# Patient Record
Sex: FEMALE | Race: WHITE | NOT HISPANIC OR LATINO | Employment: FULL TIME | URBAN - METROPOLITAN AREA
[De-identification: names, ages, dates, MRNs, and addresses within clinical notes are randomized per-mention and may not be internally consistent; named-entity substitution may affect disease eponyms.]

---

## 2023-06-13 LAB
ALANINE AMINOTRANSFERASE (SGPT) (U/L) IN SER/PLAS: 20 U/L (ref 7–45)
ALBUMIN (G/DL) IN SER/PLAS: 4.2 G/DL (ref 3.4–5)
ALKALINE PHOSPHATASE (U/L) IN SER/PLAS: 52 U/L (ref 33–110)
ANION GAP IN SER/PLAS: 12 MMOL/L (ref 10–20)
ASPARTATE AMINOTRANSFERASE (SGOT) (U/L) IN SER/PLAS: 14 U/L (ref 9–39)
BILIRUBIN TOTAL (MG/DL) IN SER/PLAS: 0.5 MG/DL (ref 0–1.2)
CALCIUM (MG/DL) IN SER/PLAS: 9.3 MG/DL (ref 8.6–10.3)
CARBON DIOXIDE, TOTAL (MMOL/L) IN SER/PLAS: 25 MMOL/L (ref 21–32)
CHLORIDE (MMOL/L) IN SER/PLAS: 105 MMOL/L (ref 98–107)
CREATININE (MG/DL) IN SER/PLAS: 0.85 MG/DL (ref 0.5–1.05)
DEHYDROEPIANDROSTERONE SULFATE (DHEA-S) (UG/DL) IN SER/: 32 UG/DL (ref 65–395)
ESTIMATED AVERAGE GLUCOSE FOR HBA1C: 105 MG/DL
GFR FEMALE: >90 ML/MIN/1.73M2
GLUCOSE (MG/DL) IN SER/PLAS: 87 MG/DL (ref 74–99)
HEMOGLOBIN A1C/HEMOGLOBIN TOTAL IN BLOOD: 5.3 %
INSULIN, FASTING: 25 UIU/ML (ref 3–25)
POTASSIUM (MMOL/L) IN SER/PLAS: 4 MMOL/L (ref 3.5–5.3)
PROTEIN TOTAL: 7 G/DL (ref 6.4–8.2)
SODIUM (MMOL/L) IN SER/PLAS: 138 MMOL/L (ref 136–145)
UREA NITROGEN (MG/DL) IN SER/PLAS: 14 MG/DL (ref 6–23)

## 2023-06-19 LAB
TESTOSTERONE FREE (CHAN): 6.7 PG/ML (ref 0.1–6.4)
TESTOSTERONE,TOTAL,LC-MS/MS: 37 NG/DL (ref 2–45)

## 2023-10-04 ENCOUNTER — TELEPHONE (OUTPATIENT)
Dept: OBSTETRICS AND GYNECOLOGY | Facility: CLINIC | Age: 28
End: 2023-10-04

## 2023-10-04 DIAGNOSIS — E88.819 INSULIN RESISTANCE: ICD-10-CM

## 2023-10-04 DIAGNOSIS — E28.2 PCOS (POLYCYSTIC OVARIAN SYNDROME): ICD-10-CM

## 2023-10-04 RX ORDER — METFORMIN HYDROCHLORIDE EXTENDED-RELEASE TABLETS 500 MG/1
1000 TABLET, FILM COATED, EXTENDED RELEASE ORAL
COMMUNITY
End: 2023-10-04 | Stop reason: SDUPTHER

## 2023-10-05 DIAGNOSIS — E88.819 INSULIN RESISTANCE: ICD-10-CM

## 2023-10-05 DIAGNOSIS — E28.2 PCOS (POLYCYSTIC OVARIAN SYNDROME): ICD-10-CM

## 2023-10-05 RX ORDER — METFORMIN HYDROCHLORIDE EXTENDED-RELEASE TABLETS 500 MG/1
1000 TABLET, FILM COATED, EXTENDED RELEASE ORAL
Qty: 180 TABLET | Refills: 0 | Status: SHIPPED | OUTPATIENT
Start: 2023-10-05 | End: 2023-10-05

## 2023-10-05 RX ORDER — METFORMIN HYDROCHLORIDE 500 MG/1
1000 TABLET, EXTENDED RELEASE ORAL
Qty: 60 TABLET | Refills: 3 | Status: SHIPPED | OUTPATIENT
Start: 2023-10-05 | End: 2023-12-06 | Stop reason: SDUPTHER

## 2023-10-16 DIAGNOSIS — N97.0 INFERTILITY ASSOCIATED WITH ANOVULATION: Primary | ICD-10-CM

## 2023-10-16 NOTE — TELEPHONE ENCOUNTER
Patient of Dr. Mccall last seen September 2023 is on week 4 and would like some blood work for ovulation.

## 2023-10-18 ENCOUNTER — LAB (OUTPATIENT)
Dept: LAB | Facility: LAB | Age: 28
End: 2023-10-18
Payer: COMMERCIAL

## 2023-10-18 DIAGNOSIS — N97.0 INFERTILITY ASSOCIATED WITH ANOVULATION: ICD-10-CM

## 2023-10-18 LAB — PROGEST SERPL-MCNC: 0.3 NG/ML

## 2023-10-18 PROCEDURE — 84144 ASSAY OF PROGESTERONE: CPT

## 2023-10-18 PROCEDURE — 36415 COLL VENOUS BLD VENIPUNCTURE: CPT

## 2023-10-19 ENCOUNTER — TELEPHONE (OUTPATIENT)
Dept: OBSTETRICS AND GYNECOLOGY | Facility: CLINIC | Age: 28
End: 2023-10-19
Payer: COMMERCIAL

## 2023-10-19 DIAGNOSIS — N97.0 INFERTILITY ASSOCIATED WITH ANOVULATION: Primary | ICD-10-CM

## 2023-10-19 RX ORDER — CLOMIPHENE CITRATE 50 MG/1
100 TABLET ORAL DAILY
Qty: 10 TABLET | Refills: 0 | Status: SHIPPED | OUTPATIENT
Start: 2023-10-19 | End: 2023-10-24

## 2023-10-19 NOTE — TELEPHONE ENCOUNTER
Patient called about results of progesteron saw it was low probably did not ovulate and said you guwang talked about increasing her clomid so she is wondering if that can be done? Please advise

## 2023-11-27 ENCOUNTER — TELEPHONE (OUTPATIENT)
Dept: OBSTETRICS AND GYNECOLOGY | Facility: CLINIC | Age: 28
End: 2023-11-27
Payer: COMMERCIAL

## 2023-11-27 DIAGNOSIS — N97.0 ABSENCE OF OVULATION: Primary | ICD-10-CM

## 2023-11-27 NOTE — TELEPHONE ENCOUNTER
Patient is requesting order to check progesterone levels, to see if she ovulated, she would like a call back to be notified if possible

## 2023-11-28 ENCOUNTER — LAB (OUTPATIENT)
Dept: LAB | Facility: LAB | Age: 28
End: 2023-11-28
Payer: COMMERCIAL

## 2023-11-28 DIAGNOSIS — N97.0 ABSENCE OF OVULATION: ICD-10-CM

## 2023-11-28 LAB — PROGEST SERPL-MCNC: <0.3 NG/ML

## 2023-11-28 PROCEDURE — 84144 ASSAY OF PROGESTERONE: CPT

## 2023-11-28 PROCEDURE — 36415 COLL VENOUS BLD VENIPUNCTURE: CPT

## 2023-11-29 ENCOUNTER — TELEPHONE (OUTPATIENT)
Dept: OBSTETRICS AND GYNECOLOGY | Facility: CLINIC | Age: 28
End: 2023-11-29
Payer: COMMERCIAL

## 2023-11-29 DIAGNOSIS — N97.0 INFERTILITY ASSOCIATED WITH ANOVULATION: ICD-10-CM

## 2023-11-29 DIAGNOSIS — N91.2 AMENORRHEA: Primary | ICD-10-CM

## 2023-11-29 RX ORDER — FLUTICASONE PROPIONATE 50 MCG
2 SPRAY, SUSPENSION (ML) NASAL DAILY
COMMUNITY
Start: 2021-09-13

## 2023-11-29 RX ORDER — PRENATAL WITH FERROUS FUM AND FOLIC ACID 3080; 920; 120; 400; 22; 1.84; 3; 20; 10; 1; 12; 200; 27; 25; 2 [IU]/1; [IU]/1; MG/1; [IU]/1; MG/1; MG/1; MG/1; MG/1; MG/1; MG/1; UG/1; MG/1; MG/1; MG/1; MG/1
1 TABLET ORAL DAILY
COMMUNITY
Start: 2023-10-10

## 2023-11-29 RX ORDER — LEVOCETIRIZINE DIHYDROCHLORIDE 5 MG/1
5 TABLET, FILM COATED ORAL
COMMUNITY
Start: 2021-04-22

## 2023-11-29 RX ORDER — LETROZOLE 2.5 MG/1
2.5 TABLET, FILM COATED ORAL DAILY
Qty: 5 TABLET | Refills: 1 | Status: SHIPPED | OUTPATIENT
Start: 2023-11-29 | End: 2023-12-11

## 2023-11-29 RX ORDER — MEDROXYPROGESTERONE ACETATE 10 MG/1
10 TABLET ORAL DAILY
Qty: 5 TABLET | Refills: 2 | Status: SHIPPED | OUTPATIENT
Start: 2023-11-29 | End: 2024-02-16 | Stop reason: SDUPTHER

## 2023-12-11 ENCOUNTER — TELEMEDICINE (OUTPATIENT)
Dept: OBSTETRICS AND GYNECOLOGY | Facility: CLINIC | Age: 28
End: 2023-12-11
Payer: COMMERCIAL

## 2023-12-11 VITALS — HEIGHT: 66 IN | WEIGHT: 200 LBS | BODY MASS INDEX: 32.14 KG/M2

## 2023-12-11 DIAGNOSIS — N97.0 INFERTILITY ASSOCIATED WITH ANOVULATION: Primary | ICD-10-CM

## 2023-12-11 PROCEDURE — 99212 OFFICE O/P EST SF 10 MIN: CPT | Performed by: OBSTETRICS & GYNECOLOGY

## 2023-12-11 NOTE — PROGRESS NOTES
Subjective   Patient ID: Tabby Castano is a 28 y.o. female who presents for medication follow up (Go over medication provera, letrozolel, clomid and has a few question).  DINESH Morris is here for follow-up of her infertility treatment.  She took 2 months of Clomid 50 and 100 mg daily and did not ovulate and wanted to switch to letrozole..  Started 6 days ago and she just started letrozole 2.5 mg daily yesterday.  She has no side effects.  She continues to take her prenatal vitamins..  Will review ovulation kit, timing of intercourse and also repeating progesterone level day 23.  All her questions were answered.  This is a phone visit.    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  Orders were put in.  She will take Provera to start it.  If pregnancy test is negative and she has no spontaneous cycles.  Follow-up in couple of months.       Stepan Mccall MD 12/11/23 12:27 PM

## 2023-12-28 ENCOUNTER — TELEPHONE (OUTPATIENT)
Dept: OBSTETRICS AND GYNECOLOGY | Facility: CLINIC | Age: 28
End: 2023-12-28
Payer: COMMERCIAL

## 2023-12-28 NOTE — TELEPHONE ENCOUNTER
Patient is requesting to check her ovulation with progesterone blood work?     Last visit with Dr. Mccall 12/11/2023

## 2023-12-29 ENCOUNTER — LAB (OUTPATIENT)
Dept: LAB | Facility: LAB | Age: 28
End: 2023-12-29
Payer: COMMERCIAL

## 2023-12-29 DIAGNOSIS — N97.0 OVULATION FAILURE: Primary | ICD-10-CM

## 2023-12-29 DIAGNOSIS — N97.0 OVULATION FAILURE: ICD-10-CM

## 2023-12-29 PROCEDURE — 84144 ASSAY OF PROGESTERONE: CPT

## 2023-12-29 PROCEDURE — 36415 COLL VENOUS BLD VENIPUNCTURE: CPT

## 2023-12-30 LAB — PROGEST SERPL-MCNC: <0.3 NG/ML

## 2024-01-02 DIAGNOSIS — N97.0 INFERTILITY ASSOCIATED WITH ANOVULATION: Primary | ICD-10-CM

## 2024-01-02 RX ORDER — LETROZOLE 2.5 MG/1
5 TABLET, FILM COATED ORAL DAILY
Qty: 10 TABLET | Refills: 0 | Status: SHIPPED | OUTPATIENT
Start: 2024-01-02 | End: 2024-01-07

## 2024-01-29 ENCOUNTER — TELEPHONE (OUTPATIENT)
Dept: OBSTETRICS AND GYNECOLOGY | Facility: CLINIC | Age: 29
End: 2024-01-29
Payer: COMMERCIAL

## 2024-01-29 DIAGNOSIS — N97.0 INFERTILITY ASSOCIATED WITH ANOVULATION: ICD-10-CM

## 2024-01-29 ASSESSMENT — LIFESTYLE VARIABLES
TOBACCO_USE: NO
HISTORY_ALCOHOL_USE: NO

## 2024-01-30 ENCOUNTER — CONSULT (OUTPATIENT)
Dept: ENDOCRINOLOGY | Facility: CLINIC | Age: 29
End: 2024-01-30
Payer: COMMERCIAL

## 2024-01-30 ENCOUNTER — LAB (OUTPATIENT)
Dept: LAB | Facility: LAB | Age: 29
End: 2024-01-30
Payer: COMMERCIAL

## 2024-01-30 VITALS
WEIGHT: 223 LBS | HEIGHT: 66 IN | HEART RATE: 91 BPM | DIASTOLIC BLOOD PRESSURE: 87 MMHG | BODY MASS INDEX: 35.84 KG/M2 | SYSTOLIC BLOOD PRESSURE: 129 MMHG | TEMPERATURE: 97.5 F

## 2024-01-30 DIAGNOSIS — Z01.83 ENCOUNTER FOR RH BLOOD TYPING: ICD-10-CM

## 2024-01-30 DIAGNOSIS — Z31.41 FERTILITY TESTING: Primary | ICD-10-CM

## 2024-01-30 DIAGNOSIS — Z13.1 SCREENING FOR DIABETES MELLITUS: ICD-10-CM

## 2024-01-30 DIAGNOSIS — N91.3 PRIMARY OLIGOMENORRHEA: ICD-10-CM

## 2024-01-30 DIAGNOSIS — Z31.41 FERTILITY TESTING: ICD-10-CM

## 2024-01-30 DIAGNOSIS — Z01.812 ENCOUNTER FOR PREPROCEDURAL LABORATORY EXAMINATION: ICD-10-CM

## 2024-01-30 DIAGNOSIS — Z11.59 ENCOUNTER FOR SCREENING FOR OTHER VIRAL DISEASES: ICD-10-CM

## 2024-01-30 DIAGNOSIS — N97.0 INFERTILITY ASSOCIATED WITH ANOVULATION: ICD-10-CM

## 2024-01-30 DIAGNOSIS — Z11.3 SCREENING FOR STDS (SEXUALLY TRANSMITTED DISEASES): ICD-10-CM

## 2024-01-30 LAB
ABO GROUP (TYPE) IN BLOOD: NORMAL
ANTIBODY SCREEN: NORMAL
DHEA-S SERPL-MCNC: 52 UG/DL (ref 65–395)
EST. AVERAGE GLUCOSE BLD GHB EST-MCNC: 91 MG/DL
ESTRADIOL SERPL-MCNC: 264 PG/ML
FSH SERPL-ACNC: 15.3 IU/L
HBA1C MFR BLD: 4.8 %
HBV SURFACE AG SERPL QL IA: NONREACTIVE
HCV AB SER QL: NONREACTIVE
HIV 1+2 AB+HIV1 P24 AG SERPL QL IA: NONREACTIVE
LH SERPL-ACNC: 96 IU/L
PROGEST SERPL-MCNC: 0.7 NG/ML
PROLACTIN SERPL-MCNC: 10.4 UG/L (ref 3–20)
RH FACTOR (ANTIGEN D): NORMAL
RUBV IGG SERPL IA-ACNC: 1 IA
RUBV IGG SERPL QL IA: POSITIVE
TREPONEMA PALLIDUM IGG+IGM AB [PRESENCE] IN SERUM OR PLASMA BY IMMUNOASSAY: NONREACTIVE
TSH SERPL-ACNC: 2 MIU/L (ref 0.44–3.98)
VARICELLA ZOSTER IGG INDEX: 0.4 IA
VZV IGG SER QL IA: NEGATIVE

## 2024-01-30 PROCEDURE — 82627 DEHYDROEPIANDROSTERONE: CPT

## 2024-01-30 PROCEDURE — 84144 ASSAY OF PROGESTERONE: CPT

## 2024-01-30 PROCEDURE — 82670 ASSAY OF TOTAL ESTRADIOL: CPT

## 2024-01-30 PROCEDURE — 86901 BLOOD TYPING SEROLOGIC RH(D): CPT

## 2024-01-30 PROCEDURE — 83516 IMMUNOASSAY NONANTIBODY: CPT

## 2024-01-30 PROCEDURE — 84443 ASSAY THYROID STIM HORMONE: CPT

## 2024-01-30 PROCEDURE — 83498 ASY HYDROXYPROGESTERONE 17-D: CPT

## 2024-01-30 PROCEDURE — 84402 ASSAY OF FREE TESTOSTERONE: CPT

## 2024-01-30 PROCEDURE — 83036 HEMOGLOBIN GLYCOSYLATED A1C: CPT

## 2024-01-30 PROCEDURE — 86803 HEPATITIS C AB TEST: CPT

## 2024-01-30 PROCEDURE — 99215 OFFICE O/P EST HI 40 MIN: CPT | Performed by: OBSTETRICS & GYNECOLOGY

## 2024-01-30 PROCEDURE — 86317 IMMUNOASSAY INFECTIOUS AGENT: CPT

## 2024-01-30 PROCEDURE — 86780 TREPONEMA PALLIDUM: CPT

## 2024-01-30 PROCEDURE — 87800 DETECT AGNT MULT DNA DIREC: CPT

## 2024-01-30 PROCEDURE — 83001 ASSAY OF GONADOTROPIN (FSH): CPT

## 2024-01-30 PROCEDURE — 86900 BLOOD TYPING SEROLOGIC ABO: CPT

## 2024-01-30 PROCEDURE — 86787 VARICELLA-ZOSTER ANTIBODY: CPT

## 2024-01-30 PROCEDURE — 87389 HIV-1 AG W/HIV-1&-2 AB AG IA: CPT

## 2024-01-30 PROCEDURE — 87340 HEPATITIS B SURFACE AG IA: CPT

## 2024-01-30 PROCEDURE — 99205 OFFICE O/P NEW HI 60 MIN: CPT | Performed by: OBSTETRICS & GYNECOLOGY

## 2024-01-30 PROCEDURE — 83002 ASSAY OF GONADOTROPIN (LH): CPT

## 2024-01-30 PROCEDURE — 86850 RBC ANTIBODY SCREEN: CPT

## 2024-01-30 PROCEDURE — 36415 COLL VENOUS BLD VENIPUNCTURE: CPT

## 2024-01-30 PROCEDURE — 84146 ASSAY OF PROLACTIN: CPT

## 2024-01-30 ASSESSMENT — PATIENT HEALTH QUESTIONNAIRE - PHQ9
2. FEELING DOWN, DEPRESSED OR HOPELESS: NOT AT ALL
SUM OF ALL RESPONSES TO PHQ9 QUESTIONS 1 AND 2: 0
1. LITTLE INTEREST OR PLEASURE IN DOING THINGS: NOT AT ALL

## 2024-01-30 ASSESSMENT — COLUMBIA-SUICIDE SEVERITY RATING SCALE - C-SSRS
1. IN THE PAST MONTH, HAVE YOU WISHED YOU WERE DEAD OR WISHED YOU COULD GO TO SLEEP AND NOT WAKE UP?: NO
2. HAVE YOU ACTUALLY HAD ANY THOUGHTS OF KILLING YOURSELF?: NO
6. HAVE YOU EVER DONE ANYTHING, STARTED TO DO ANYTHING, OR PREPARED TO DO ANYTHING TO END YOUR LIFE?: NO

## 2024-01-30 ASSESSMENT — PAIN SCALES - GENERAL: PAINLEVEL: 0-NO PAIN

## 2024-01-30 NOTE — LETTER
2024     Stepan Mccall MD  [address]  [address]  [facility] [address]    Patient: Tabby Castano   YOB: 1995   Date of Visit: 2024       Dear Dr. Stepan Mccall MD:    Thank you for referring Tabby Castano to me for evaluation. Below are my notes for this consultation.  If you have questions, please do not hesitate to call me. I look forward to following your patient along with you.       Sincerely,     Jean Peguero MD      CC: No Recipients  ______________________________________________________________________________________      In Person    NEW FERTILITY PATIENT VISIT    Referred by: Referred by:: Dr. Mccall    Accompanied today by: Who is accompanying you to your visit:: Tanmay Castano ()      Tabby Castano is a 28 y.o.  female who presents today for evaluation and treatment of primary infertility, irregular menses.  The patient reports that she has always had irregular menses/oligomenorrhea and started on OCPs to regulate her cycles at the age of 17.  She continued these medications until approximately 3 years.  Following discontinuation she reports that menses once again became irregular and that she can go for several months at a time without having a menstrual period.  She does reports that menses do come reliably with use of Provera, and she has been using this recently to bring on monthly menstrual periods under the guidance of her doctor.      She and her partner have been attempting to become pregnant over the last year without success.  They were seen and evaluated by Dr. Mccall, and has been through 2 rounds of Clomid (up to 10 mg) and to rounds of letrozole 5 without resultant ovulation (no positive OPK or elevated progesterone levels).  They presents today to discuss further testing and next steps.  Patient reports that her last menstrual period was on 2024 (following Provera).    PRIOR EVALUATION / TREATMENT    Prior  Labs  Lab Results    Date Done      AMH: No results found for requested labs within last 1825 days. No results found for requested labs within last 1825 days.   TSH: 2.70 (Ref range: 0.44 - 3.98 mIU/L) 2/1/2023   PRL: 8.1 (Ref range: 3.0 - 20.0 ug/L) 2/1/2023   Testosterone: No results found for requested labs within last 1825 days. No results found for requested labs within last 1825 days.   DHEAS: 32 (L; Ref range: 65 - 395 ug/dL) 6/13/2023   FSH: 6.5 (Ref range: IU/L) 2/1/2023   17 OHP: No results found for requested labs within last 1825 days. No results found for requested labs within last 1825 days.   HgbA1c: 5.3 (Ref range: %) 6/13/2023   Hepatitis B surface antigen: No results found for requested labs within last 1825 days. No results found for requested labs within last 1825 days.   Hepatitis C antibody: No results found for requested labs within last 1825 days. No results found for requested labs within last 1825 days.   HIV ½ Antigen Antibody screen with reflex: No results found for requested labs within last 1825 days. No results found for requested labs within last 1825 days.   Syphilis screening with reflex: No results found for requested labs within last 1825 days. No results found for requested labs within last 1825 days.   GC: No results found for requested labs within last 1825 days. No results found for requested labs within last 1825 days.   CT: No results found for requested labs within last 1825 days. No results found for requested labs within last 1825 days.   Type and Screen: No results found for requested labs within last 1825 days. No results found for requested labs within last 1825 days.   Rh: No results found for requested labs within last 1825 days. No results found for requested labs within last 1825 days.   Antibody: No results found for requested labs within last 1825 days. No results found for requested labs within last 1825 days.   Rubella: No results found for requested labs  within last 1825 days. No results found for requested labs within last 1825 days.   Varicella: No results found for requested labs within last 1825 days. No results found for requested labs within last 1825 days.   Hemoglobin: No results found for requested labs within last 1825 days. No results found for requested labs within last 1825 days.   Hematocrit: No results found for requested labs within last 1825 days. No results found for requested labs within last 1825 days.   Creatinine: 0.85 (Ref range: 0.50 - 1.05 mg/dL) 2023   AST:14 (Ref range: 9 - 39 U/L) 2023   ALT:20 (Ref range: 7 - 45 U/L): 2023        Relationship Status:       OB Hx     OB History          0    Para   0    Term   0       0    AB   0    Living   0         SAB   0    IAB   0    Ectopic   0    Multiple   0    Live Births   0                 GYN HISTORY   Have you ever been diagnosed with a sexually transmitted disease? Have you ever been diagnosed with a sexually transmitted disease?: No    Please select all that are applicable:    Have you ever had Pelvic Inflammatory Disease? Have you ever had Pelvic Inflammatory Disease?: No    Have you had an abnormal PAP smear? Have you had an abnormal PAP smear?: No    Date & Result of last PAP smear: negative cytology 2023  Have you ever had an abnormal Mammogram? Have you ever had an abnormal mammogram?: No    Date & result of your last mammogram:    Do you have pelvic pain? Do you have pelvic pain?: No    How many times per week do you have intercourse?    Do you have pain with intercourse? Do you have pain with intercourse?: No    Do you use lubricants with intercourse?    Do you have pain with bowel movements? Do you have pain with bowel movements?: No              Do you have pain with a full bladder? Do you have pain with a full bladder?: No    MENSTRUAL HISTORY  LMP: When was your last menstrual period?: Other    Menarche:    Contraception:    Cycle length:     Describe your bleeding: Describe your bleeding:: Average    Dysmenorrhea: Are your menstrual periods painful?: No       ENDOCRINE/INFERTILITY HISTORY  Duration of infertility: If applicable, what is the approximate date you began trying to get pregnant?: 1-5 years    Coital Activity/week:    Nipple Discharge: Do you experience any loss of milk or liquid discharge from the breasts?: No    Vision changes: Are you experiencing any vision changes?: No    Headaches: Are you experiencing headaches?: Yes    Excess hair growth: Are you experiencing persistent or worsening hair growth on the face, breasts or lower abdomen?: No    Excessive hair loss: Are you experiencing loss of hair from your scalp?: No    Acne: Are you experiencing acne?: No    Oily skin: Oily skin?: No    Recent weight change  Weight gain: Are you experiencing any increase in weight?: Yes    Weight loss: Are you experiencing any decrease in weight?: No    Exercise more than 3 times a week: Exercise more than 3 times a week?: No      PMH  Past Medical History:   Diagnosis Date   • Hyperlipidemia    • PCOS (polycystic ovarian syndrome)         MEDICATIONS  Current Outpatient Medications on File Prior to Visit   Medication Sig Dispense Refill   • fluticasone (Flonase) 50 mcg/actuation nasal spray Administer 2 sprays into each nostril once daily.     • levocetirizine (Xyzal) 5 mg tablet Take 1 tablet (5 mg) by mouth once daily.     • metFORMIN  mg 24 hr tablet Take 2 tablets (1,000 mg) by mouth once daily in the evening. Take with meals. 60 tablet 3   • Prenatal Vitamin Plus Low Iron 27 mg iron- 1 mg tablet Take 1 tablet by mouth once daily.     • medroxyPROGESTERone (Provera) 10 mg tablet Take 1 tablet (10 mg) by mouth once daily for 5 days. 5 tablet 2     No current facility-administered medications on file prior to visit.        PSH  Past Surgical History:   Procedure Laterality Date   • NASAL SEPTUM SURGERY     • TOE SURGERY Left    •  "TONSILECTOMY, ADENOIDECTOMY, BILATERAL MYRINGOTOMY AND TUBES     • TYMPANOSTOMY TUBE PLACEMENT Bilateral         PSYCH HISTORY  Have you ever been diagnosed with a mental health Issue?: No    Have you ever been hospitalized for a mental health disorder?: No       SOCIAL HISTORY  Social History     Tobacco Use   • Smoking status: Never     Passive exposure: Never   • Smokeless tobacco: Never   Vaping Use   • Vaping Use: Never used   Substance Use Topics   • Alcohol use: Not Currently   • Drug use: Never     Occupation: Your occupation:: RN    Have you ever been incarcerated? Have you ever been incarcerated?: No    Do you have a history of domestic violence? Do you have a history of domestic violence?: No    Do you feel safe at home? Do you feel safe at home?: Yes    Do you have a history of any negative sexual experience such as incest or rape? Do you have a history of any negative sexual experience such as incest or rape?: No       PARTNER HISTORY  Partner Name: Partner name:: Ajit    : Partner :: 05/15/95    Occupation: Partner occupation:: [employer]    Prior fertility history:   none  PMH:   \"cyst\" on testicle  PSH:    Smoking:Partner: Recent or current tobacco use: No    Alcohol Use: Partner: Recent or current alcohol use: Yes    Drug Use: Partner: Recent or current drug use: No    Medications:    Injuries: Partner: Any history of surgeries or injuries in the reproductive area?: No    STD: Have you ever been diagnosed with a sexually transmitted disease?: No    Please select all that are applicable:    SA: Prior Semen Analysis completed?: No    SA Results:      FAMILY HISTORY  No family history on file.    CANCER HISTORY    Breast: Breast Cancer: Please answer Yes, No or Unsure. If Yes, please, identify which family member.: No    Ovarian: Ovarian Cancer: Please answer Yes, No or Unsure. If Yes, please, identify which family member.: Yes maternal grandmother    Colon: Colon Cancer: Please answer Yes, No or " "Unsure. If Yes, please, identify which family member.: No    Endometrial: Endometrial Cancer: Please answer Yes, No or Unsure. If Yes, please, identify which family member.: No      FAMILY VTE HISTORY  Family History of Blood Clots: Blood Clots: Please answer Yes, No or Unsure. If Yes, please, identify which family member.: Yes patenral grandmother      GENETIC HISTORY  Ethnic Background  Patient: Ethnic Background Patient:: White    Partner: Ethnic Background Partner:: White    Genetic Disease in Family  Patient: Patient: Defects and genetic syndromes? Please answer Yes, No or Unsure: No    Partner: Partner: Defects and genetic syndromes? Please answer Yes, No or Unsure: No    Birth Defects in Family  Patient: Patient: Birth defects? Please answer Yes, No or Unsure: No    Partner: Partner: Birth defects? Please answer Yes, No or Unsure: No    Genetic screening performed previously:       BMI:   BMI Readings from Last 1 Encounters:   01/30/24 35.99 kg/m²     VITALS:  /87   Pulse 91   Temp 36.4 °C (97.5 °F)   Ht 1.676 m (5' 6\")   Wt 101 kg (223 lb)   LMP 01/09/2024 (Exact Date)   BMI 35.99 kg/m²     ASSESSMENT   Alexandra is a 28-year-old female who presents today for evaluation and treatment of primary infertility, oligomenorrhea likely associated with anovulation.    COUNSELING  We discussed causes of infertility including hormonal, egg quality issues, structural problems such as endometriosis, adhesions, or tubal problems, uterine factors such as polyps or fibroids, and sperm issues. Reviewed evaluation of such as well. We discussed various methods for achieving pregnancy in some detail including, ovulation induction, insemination, superovulation and IVF.    Routine Testing  Fertility Center  STDs Within 1 year   Genetic carrier Waiver/Completed   T&S Within 1 year   AMH Within 1 year   TSH Within 1 year   Rubella/Varicella Within 5 years     BMI Testing  Fertility Center  CBC Within 1 year   CMP " Within 1 year   HgbA1c Within 1 year   Mag, Phos, Vit D <18 Within 1 year   MFM > 40  REQ   Wt loss consult > 40 OPT     PLAN  Orders Placed This Encounter   Procedures   • Hysterosalpingogram (HSG)   • US pelvis transvaginal   • FL hysterosalpingogram   • Antimullerian Hormone (Amh)   • TSH with reflex to Free T4 if abnormal   • Prolactin   • Testosterone,Free and Total   • Dhea-Sulfate   • 17-Hydroxyprogesterone   • Hemoglobin A1C   • Follicle Stimulating Hormone   • Luteinizing Hormone   • Estradiol   • Type And Screen   • Progesterone   • Rubella Antibody, Igg   • Varicella Zoster Antibody, Igg   • Hepatitis B surface antigen   • Hepatitis C Antibody   • HIV 1/2 Antigen/Antibody Screen with Reflex to Confirmation   • Syphilis Screen with Reflex   • C. Trachomatis / N. Gonorrhoeae, Amplified Detection   • POCT pregnancy, urine manually resulted       GENETIC SCREENING PATIENT  Declined, will need waiver    PARTNER  Yes Semen Analysis: Ordered  Yes Genetic screening: declined    FOLLOW UP   Consults: Financial consult  Chart to primary nurse for care coordination and patient check list/education  Enroll in Engaged MD  Take prenatal vitamins, vitamin D 2000 IUs daily  Discussed that PAP and mammogram must be updated if appropriate based on age and clinical history and results received before treatment can begin  Discussed that treatment cannot proceed until checklist items are complete   6 week follow up with MD  Additional testing for BMI < 18 or > 40: NA      A copy of this note was sent electronically to Dr. Stefany Peguero  01/30/2024  8:26 AM

## 2024-01-30 NOTE — Clinical Note
Patient was seen for a new fertility visit, see my note for details.  Can someone reach out to her when they have an opportunity?  Thank you

## 2024-01-30 NOTE — PROGRESS NOTES
In Person    NEW FERTILITY PATIENT VISIT    Referred by: Referred by:: Dr. Mccall    Accompanied today by: Who is accompanying you to your visit:: Tanmay Castano ()      Tabby Castano is a 28 y.o.  female who presents today for evaluation and treatment of primary infertility, irregular menses.  The patient reports that she has always had irregular menses/oligomenorrhea and started on OCPs to regulate her cycles at the age of 17.  She continued these medications until approximately 3 years.  Following discontinuation she reports that menses once again became irregular and that she can go for several months at a time without having a menstrual period.  She does reports that menses do come reliably with use of Provera, and she has been using this recently to bring on monthly menstrual periods under the guidance of her doctor.      She and her partner have been attempting to become pregnant over the last year without success.  They were seen and evaluated by Dr. Mccall, and has been through 2 rounds of Clomid (up to 10 mg) and to rounds of letrozole 5 without resultant ovulation (no positive OPK or elevated progesterone levels).  They presents today to discuss further testing and next steps.  Patient reports that her last menstrual period was on 2024 (following Provera).    PRIOR EVALUATION / TREATMENT    Prior Labs  Lab Results    Date Done      AMH: No results found for requested labs within last 1825 days. No results found for requested labs within last 1825 days.   TSH: 2.70 (Ref range: 0.44 - 3.98 mIU/L) 2023   PRL: 8.1 (Ref range: 3.0 - 20.0 ug/L) 2023   Testosterone: No results found for requested labs within last 1825 days. No results found for requested labs within last 1825 days.   DHEAS: 32 (L; Ref range: 65 - 395 ug/dL) 2023   FSH: 6.5 (Ref range: IU/L) 2023   17 OHP: No results found for requested labs within last 1825 days. No results found for  requested labs within last 1825 days.   HgbA1c: 5.3 (Ref range: %) 6/13/2023   Hepatitis B surface antigen: No results found for requested labs within last 1825 days. No results found for requested labs within last 1825 days.   Hepatitis C antibody: No results found for requested labs within last 1825 days. No results found for requested labs within last 1825 days.   HIV ½ Antigen Antibody screen with reflex: No results found for requested labs within last 1825 days. No results found for requested labs within last 1825 days.   Syphilis screening with reflex: No results found for requested labs within last 1825 days. No results found for requested labs within last 1825 days.   GC: No results found for requested labs within last 1825 days. No results found for requested labs within last 1825 days.   CT: No results found for requested labs within last 1825 days. No results found for requested labs within last 1825 days.   Type and Screen: No results found for requested labs within last 1825 days. No results found for requested labs within last 1825 days.   Rh: No results found for requested labs within last 1825 days. No results found for requested labs within last 1825 days.   Antibody: No results found for requested labs within last 1825 days. No results found for requested labs within last 1825 days.   Rubella: No results found for requested labs within last 1825 days. No results found for requested labs within last 1825 days.   Varicella: No results found for requested labs within last 1825 days. No results found for requested labs within last 1825 days.   Hemoglobin: No results found for requested labs within last 1825 days. No results found for requested labs within last 1825 days.   Hematocrit: No results found for requested labs within last 1825 days. No results found for requested labs within last 1825 days.   Creatinine: 0.85 (Ref range: 0.50 - 1.05 mg/dL) 6/13/2023   AST:14 (Ref range: 9 - 39 U/L) 6/13/2023    ALT:20 (Ref range: 7 - 45 U/L): 2023        Relationship Status:       OB Hx     OB History          0    Para   0    Term   0       0    AB   0    Living   0         SAB   0    IAB   0    Ectopic   0    Multiple   0    Live Births   0                 GYN HISTORY   Have you ever been diagnosed with a sexually transmitted disease? Have you ever been diagnosed with a sexually transmitted disease?: No    Please select all that are applicable:    Have you ever had Pelvic Inflammatory Disease? Have you ever had Pelvic Inflammatory Disease?: No    Have you had an abnormal PAP smear? Have you had an abnormal PAP smear?: No    Date & Result of last PAP smear: negative cytology 2023  Have you ever had an abnormal Mammogram? Have you ever had an abnormal mammogram?: No    Date & result of your last mammogram:    Do you have pelvic pain? Do you have pelvic pain?: No    How many times per week do you have intercourse?    Do you have pain with intercourse? Do you have pain with intercourse?: No    Do you use lubricants with intercourse?    Do you have pain with bowel movements? Do you have pain with bowel movements?: No              Do you have pain with a full bladder? Do you have pain with a full bladder?: No    MENSTRUAL HISTORY  LMP: When was your last menstrual period?: Other    Menarche:    Contraception:    Cycle length:    Describe your bleeding: Describe your bleeding:: Average    Dysmenorrhea: Are your menstrual periods painful?: No       ENDOCRINE/INFERTILITY HISTORY  Duration of infertility: If applicable, what is the approximate date you began trying to get pregnant?: 1-5 years    Coital Activity/week:    Nipple Discharge: Do you experience any loss of milk or liquid discharge from the breasts?: No    Vision changes: Are you experiencing any vision changes?: No    Headaches: Are you experiencing headaches?: Yes    Excess hair growth: Are you experiencing persistent or worsening hair growth on  the face, breasts or lower abdomen?: No    Excessive hair loss: Are you experiencing loss of hair from your scalp?: No    Acne: Are you experiencing acne?: No    Oily skin: Oily skin?: No    Recent weight change  Weight gain: Are you experiencing any increase in weight?: Yes    Weight loss: Are you experiencing any decrease in weight?: No    Exercise more than 3 times a week: Exercise more than 3 times a week?: No      PMH  Past Medical History:   Diagnosis Date    Hyperlipidemia     PCOS (polycystic ovarian syndrome)         MEDICATIONS  Current Outpatient Medications on File Prior to Visit   Medication Sig Dispense Refill    fluticasone (Flonase) 50 mcg/actuation nasal spray Administer 2 sprays into each nostril once daily.      levocetirizine (Xyzal) 5 mg tablet Take 1 tablet (5 mg) by mouth once daily.      metFORMIN  mg 24 hr tablet Take 2 tablets (1,000 mg) by mouth once daily in the evening. Take with meals. 60 tablet 3    Prenatal Vitamin Plus Low Iron 27 mg iron- 1 mg tablet Take 1 tablet by mouth once daily.      medroxyPROGESTERone (Provera) 10 mg tablet Take 1 tablet (10 mg) by mouth once daily for 5 days. 5 tablet 2     No current facility-administered medications on file prior to visit.        PSH  Past Surgical History:   Procedure Laterality Date    NASAL SEPTUM SURGERY      TOE SURGERY Left     TONSILECTOMY, ADENOIDECTOMY, BILATERAL MYRINGOTOMY AND TUBES      TYMPANOSTOMY TUBE PLACEMENT Bilateral         PSYCH HISTORY  Have you ever been diagnosed with a mental health Issue?: No    Have you ever been hospitalized for a mental health disorder?: No       SOCIAL HISTORY  Social History     Tobacco Use    Smoking status: Never     Passive exposure: Never    Smokeless tobacco: Never   Vaping Use    Vaping Use: Never used   Substance Use Topics    Alcohol use: Not Currently    Drug use: Never     Occupation: Your occupation:: RN    Have you ever been incarcerated? Have you ever been incarcerated?:  "No    Do you have a history of domestic violence? Do you have a history of domestic violence?: No    Do you feel safe at home? Do you feel safe at home?: Yes    Do you have a history of any negative sexual experience such as incest or rape? Do you have a history of any negative sexual experience such as incest or rape?: No       PARTNER HISTORY  Partner Name: Partner name:: Ajit    : Partner :: 05/15/95    Occupation: Partner occupation:: [employer]    Prior fertility history:   none  PMH:   \"cyst\" on testicle  PSH:    Smoking:Partner: Recent or current tobacco use: No    Alcohol Use: Partner: Recent or current alcohol use: Yes    Drug Use: Partner: Recent or current drug use: No    Medications:    Injuries: Partner: Any history of surgeries or injuries in the reproductive area?: No    STD: Have you ever been diagnosed with a sexually transmitted disease?: No    Please select all that are applicable:    SA: Prior Semen Analysis completed?: No    SA Results:      FAMILY HISTORY  No family history on file.    CANCER HISTORY    Breast: Breast Cancer: Please answer Yes, No or Unsure. If Yes, please, identify which family member.: No    Ovarian: Ovarian Cancer: Please answer Yes, No or Unsure. If Yes, please, identify which family member.: Yes maternal grandmother    Colon: Colon Cancer: Please answer Yes, No or Unsure. If Yes, please, identify which family member.: No    Endometrial: Endometrial Cancer: Please answer Yes, No or Unsure. If Yes, please, identify which family member.: No      FAMILY VTE HISTORY  Family History of Blood Clots: Blood Clots: Please answer Yes, No or Unsure. If Yes, please, identify which family member.: Yes patenral grandmother      GENETIC HISTORY  Ethnic Background  Patient: Ethnic Background Patient:: White    Partner: Ethnic Background Partner:: White    Genetic Disease in Family  Patient: Patient: Defects and genetic syndromes? Please answer Yes, No or Unsure: No    Partner: Partner: " "Defects and genetic syndromes? Please answer Yes, No or Unsure: No    Birth Defects in Family  Patient: Patient: Birth defects? Please answer Yes, No or Unsure: No    Partner: Partner: Birth defects? Please answer Yes, No or Unsure: No    Genetic screening performed previously:       BMI:   BMI Readings from Last 1 Encounters:   01/30/24 35.99 kg/m²     VITALS:  /87   Pulse 91   Temp 36.4 °C (97.5 °F)   Ht 1.676 m (5' 6\")   Wt 101 kg (223 lb)   LMP 01/09/2024 (Exact Date)   BMI 35.99 kg/m²     ASSESSMENT   Alexandra is a 28-year-old female who presents today for evaluation and treatment of primary infertility, oligomenorrhea likely associated with anovulation.    COUNSELING  We discussed causes of infertility including hormonal, egg quality issues, structural problems such as endometriosis, adhesions, or tubal problems, uterine factors such as polyps or fibroids, and sperm issues. Reviewed evaluation of such as well. We discussed various methods for achieving pregnancy in some detail including, ovulation induction, insemination, superovulation and IVF.    Routine Testing  Fertility Center  STDs Within 1 year   Genetic carrier Waiver/Completed   T&S Within 1 year   AMH Within 1 year   TSH Within 1 year   Rubella/Varicella Within 5 years     BMI Testing  Fertility Center  CBC Within 1 year   CMP Within 1 year   HgbA1c Within 1 year   Mag, Phos, Vit D <18 Within 1 year   MFM > 40  REQ   Wt loss consult > 40 OPT     PLAN  Orders Placed This Encounter   Procedures    Hysterosalpingogram (HSG)    US pelvis transvaginal    FL hysterosalpingogram    Antimullerian Hormone (Amh)    TSH with reflex to Free T4 if abnormal    Prolactin    Testosterone,Free and Total    Dhea-Sulfate    17-Hydroxyprogesterone    Hemoglobin A1C    Follicle Stimulating Hormone    Luteinizing Hormone    Estradiol    Type And Screen    Progesterone    Rubella Antibody, Igg    Varicella Zoster Antibody, Igg    Hepatitis B surface antigen "    Hepatitis C Antibody    HIV 1/2 Antigen/Antibody Screen with Reflex to Confirmation    Syphilis Screen with Reflex    C. Trachomatis / N. Gonorrhoeae, Amplified Detection    POCT pregnancy, urine manually resulted       GENETIC SCREENING PATIENT  Declined, will need waiver    PARTNER  Yes Semen Analysis: Ordered  Yes Genetic screening: declined    FOLLOW UP   Consults: Financial consult  Chart to primary nurse for care coordination and patient check list/education  Enroll in Engaged MD  Take prenatal vitamins, vitamin D 2000 IUs daily  Discussed that PAP and mammogram must be updated if appropriate based on age and clinical history and results received before treatment can begin  Discussed that treatment cannot proceed until checklist items are complete   6 week follow up with MD  Additional testing for BMI < 18 or > 40: NA      A copy of this note was sent electronically to Dr. Stefany Peguero  01/30/2024  8:26 AM

## 2024-01-31 DIAGNOSIS — N97.0 INFERTILITY ASSOCIATED WITH ANOVULATION: Primary | ICD-10-CM

## 2024-01-31 LAB
C TRACH RRNA SPEC QL NAA+PROBE: NEGATIVE
N GONORRHOEA DNA SPEC QL PROBE+SIG AMP: NEGATIVE

## 2024-02-03 LAB
17OHP SERPL-MCNC: 266.64 NG/DL
MIS SERPL-MCNC: 9.72 NG/ML (ref 0.4–16.02)
TESTOSTERONE FREE (CHAN): 7.3 PG/ML (ref 0.1–6.4)
TESTOSTERONE,TOTAL,LC-MS/MS: 50 NG/DL (ref 2–45)

## 2024-02-12 ENCOUNTER — TELEPHONE (OUTPATIENT)
Dept: ENDOCRINOLOGY | Facility: CLINIC | Age: 29
End: 2024-02-12
Payer: COMMERCIAL

## 2024-02-12 DIAGNOSIS — N97.0 INFERTILITY ASSOCIATED WITH ANOVULATION: Primary | ICD-10-CM

## 2024-02-12 NOTE — TELEPHONE ENCOUNTER
Patient is calling to talk to a nurse has some questions. Did not start her cycle yet.  
RN returned patient's phone call. Patient informed RN she takes provera provided by her OBGYN to induce her period. Patient also informed RN she will be taking letrozole from her OBGYN this cycle as well, and they plan to take 7.5mg this time. RN advised patient not to take letrozole and to complete her testing with our clinic. RN also informed patient she needs to stick to one office for fertility treatment to keep her safe and reduce potential miscommunication and errors. Patient verbalized understanding, and plans to not take letrozole in order to complete her ordered testing.   Theresa Castro RN 02/12/24 4:27 PM     
<-- Click to add NO pertinent Family History

## 2024-02-13 ENCOUNTER — LAB (OUTPATIENT)
Dept: LAB | Facility: LAB | Age: 29
End: 2024-02-13
Payer: COMMERCIAL

## 2024-02-13 DIAGNOSIS — N97.0 INFERTILITY ASSOCIATED WITH ANOVULATION: ICD-10-CM

## 2024-02-13 PROCEDURE — 36415 COLL VENOUS BLD VENIPUNCTURE: CPT

## 2024-02-13 PROCEDURE — 84144 ASSAY OF PROGESTERONE: CPT

## 2024-02-14 LAB — PROGEST SERPL-MCNC: 9.2 NG/ML

## 2024-02-16 DIAGNOSIS — N91.2 AMENORRHEA: ICD-10-CM

## 2024-02-16 RX ORDER — MEDROXYPROGESTERONE ACETATE 10 MG/1
10 TABLET ORAL DAILY
Qty: 5 TABLET | Refills: 0 | Status: SHIPPED | OUTPATIENT
Start: 2024-02-16 | End: 2024-02-21

## 2024-02-19 ENCOUNTER — TELEPHONE (OUTPATIENT)
Dept: ENDOCRINOLOGY | Facility: CLINIC | Age: 29
End: 2024-02-19

## 2024-02-19 DIAGNOSIS — N97.0 INFERTILITY ASSOCIATED WITH ANOVULATION: Primary | ICD-10-CM

## 2024-02-19 RX ORDER — LETROZOLE 2.5 MG/1
5 TABLET, FILM COATED ORAL DAILY
Qty: 14 TABLET | Refills: 1 | Status: SHIPPED | OUTPATIENT
Start: 2024-02-19 | End: 2024-03-25 | Stop reason: SDUPTHER

## 2024-02-19 NOTE — TELEPHONE ENCOUNTER
Reason for call: Questions  Notes: Pt was prescribed Letrozole by her OBGYN and started her cycle Friday 2/16. She wanted to make sure pushing her testing back won't be an issue as she wants to try 1 more month on Letrozole.    Doxepin Pregnancy And Lactation Text: This medication is Pregnancy Category C and it isn't known if it is safe during pregnancy. It is also excreted in breast milk and breast feeding isn't recommended.

## 2024-02-19 NOTE — TELEPHONE ENCOUNTER
Is this something you are willing to do for the patient? I thought this was an OTC allergy med? Thanks.

## 2024-02-19 NOTE — TELEPHONE ENCOUNTER
Patient requesting refill of Levocetirizine 5MG to be sent to [facility] in [facility], she states she has to start medication tomorrow

## 2024-02-19 NOTE — TELEPHONE ENCOUNTER
Patient called back stating she needed the Letrozole 2.5 mg taking 2 pills one time a day.  Patient is requesting that this be done today because she cannot pick it up tomorrow     Pt states her OB-GYN prescribed another month of letrozole. She started it this morning. We discussed boarding pass items.   She is going to hold off on HSG and pelvic US until next month  FUV rescheduled for April with Dr. Peguero  Varicella titer negative. She believes she had the varicella vaccination, but knows she did not have chicken pox. She will check her records and let us know.  SA if not pregnant this cycyle  Will call with next period if not pregnant this cycle to schedule HSG and pelvic US.   Pt verbalized understanding and is agreeable.   JD SOLIMAN on 2/20/24 at 9:46 AM.

## 2024-02-20 NOTE — PROGRESS NOTES
Boarding Pass Oral/Injectible with TIC/IUI Checklist    Age: 28 y.o.    Provider: Jean Peguero MD  Primary RN: Elvis Weber  Reasons for Treatment:  anovulation  Last BMI  24 : 35.99 kg/m²       Past Medical History:   Diagnosis Date    Hyperlipidemia     PCOS (polycystic ovarian syndrome)        Date Done Consultation Results/Comments   (FUV) Medication Protocol Stimulation protocol: ***  Trigger plan: {TAMERA TRIGGER PLAN:62215}  Adjuncts: {TAMERA ADJUNCTS:30175}  Notes: ***   *** Procedure Order Placed []     *** MFM Consult Okay to proceed? N/A   *** Psych Consult Okay to proceed? N/A   *** Genetics Consult Okay to proceed? N/A   *** Other    Date Done Female Labs Results/Comments   2024 T&S (Q 1 Year) Results for orders placed or performed in visit on 24 (from the past 8760 hour(s))   Type And Screen   Result Value Ref Range    ABO TYPE AB     Rh TYPE NEG     ANTIBODY SCREEN NEG         2024 Hep B sAg Nonreactive   2024 Hep C AB Nonreactive   2024 HIV Nonreactive   2024 Syphilis Nonreactive   2024 GC/CT GC: Negative  CT: Negative   2024 Rubella (Q 5 Years) Positive   2024 Varicella (Q 5 Years) Negative   2024 TSH 2.00 (Ref range: 0.44 - 3.98 mIU/L)   2024 HgbA1C 4.8 (Ref range: see below %)   2024 AMH 9.719   24 Carrier Screening Myriad 2bP:  Declined- waiver signed  N/A   *** Uterine Cavity Eval Pelvic US: ***    HSG: ***    SIS: ***    Hyster: (***)     2023 Pap Smear Lab Results   Component Value Date    CVTFINALDX  2023     A.  THINPREP PAP CERVICAL:              Specimen adequacy:       SATISFACTORY FOR EVALUATION.       Quality Indicator: Endocervical/transformation zone component is present.              General Categorization:       NEGATIVE FOR INTRAEPITHELIAL LESION OR MALIGNANCY.              Ancillary Testing:       Specimen does not meet the requisition-stated criteria for HPV testing.  See Pap test interpretation  above.               N/A Mammogram N/A           Date Done Male Labs (required if IUI)   Results/Comments  ***   *** Hep B sAg ***   *** Hep C AB  ***   *** HIV ***   *** Syphilis ***   *** GC/CT GC: ***  CT: ***   2/12/24 Carrier Screening ***  Declined- waiver signed  N/A   *** Semen Analysis  Volume(mL): ***  Count(million): ***  Motility(%): ***  Motile Count(million): ***   Date Done Miscellaneous Results/Comments    BMI Checklist  BMI > 40 or < 18 Added to chart:   No    >= 45 Checklist  Added to chart:   No     MD Completion:  Ectopic Risk: {YES/NO/NA:91513}  Medically Complex: {YES/NO/NA:70171}

## 2024-03-03 DIAGNOSIS — E88.819 INSULIN RESISTANCE: ICD-10-CM

## 2024-03-03 DIAGNOSIS — E28.2 PCOS (POLYCYSTIC OVARIAN SYNDROME): ICD-10-CM

## 2024-03-05 RX ORDER — METFORMIN HYDROCHLORIDE 500 MG/1
1000 TABLET, EXTENDED RELEASE ORAL
Qty: 180 TABLET | Refills: 1 | Status: SHIPPED | OUTPATIENT
Start: 2024-03-05

## 2024-03-12 ENCOUNTER — APPOINTMENT (OUTPATIENT)
Dept: ENDOCRINOLOGY | Facility: CLINIC | Age: 29
End: 2024-03-12
Payer: COMMERCIAL

## 2024-03-18 ENCOUNTER — TELEPHONE (OUTPATIENT)
Dept: OBSTETRICS AND GYNECOLOGY | Facility: CLINIC | Age: 29
End: 2024-03-18
Payer: COMMERCIAL

## 2024-03-18 DIAGNOSIS — N97.0 INFERTILITY ASSOCIATED WITH ANOVULATION: Primary | ICD-10-CM

## 2024-03-18 NOTE — TELEPHONE ENCOUNTER
Patient of Dr. Mccall calling wanting to know if we could do blood work to see if she ovulated. Can we call when they are ready? She stated she doesn't get notified. Thank you! [phone].

## 2024-03-25 DIAGNOSIS — N97.0 INFERTILITY ASSOCIATED WITH ANOVULATION: ICD-10-CM

## 2024-03-25 RX ORDER — LETROZOLE 2.5 MG/1
5 TABLET, FILM COATED ORAL DAILY
Qty: 14 TABLET | Refills: 1 | Status: SHIPPED | OUTPATIENT
Start: 2024-03-25 | End: 2024-04-30 | Stop reason: SDUPTHER

## 2024-03-25 NOTE — TELEPHONE ENCOUNTER
Pt states she has started her cycle and is requesting a refill of Letrozole, to [facility] in [place].

## 2024-04-08 ENCOUNTER — APPOINTMENT (OUTPATIENT)
Dept: ENDOCRINOLOGY | Facility: CLINIC | Age: 29
End: 2024-04-08
Payer: COMMERCIAL

## 2024-04-12 ENCOUNTER — TELEPHONE (OUTPATIENT)
Dept: OBSTETRICS AND GYNECOLOGY | Facility: CLINIC | Age: 29
End: 2024-04-12
Payer: COMMERCIAL

## 2024-04-12 NOTE — TELEPHONE ENCOUNTER
Patient was to have her progesterone checked on day 23 and cannot do it that day and would like to do cycle day 19 or day 24.  She is leaving out of town and would like to know by 1:00 today.

## 2024-04-17 ENCOUNTER — LAB (OUTPATIENT)
Dept: LAB | Facility: LAB | Age: 29
End: 2024-04-17
Payer: COMMERCIAL

## 2024-04-17 DIAGNOSIS — N97.0 INFERTILITY ASSOCIATED WITH ANOVULATION: ICD-10-CM

## 2024-04-17 PROCEDURE — 84144 ASSAY OF PROGESTERONE: CPT

## 2024-04-17 PROCEDURE — 36415 COLL VENOUS BLD VENIPUNCTURE: CPT

## 2024-04-18 LAB — PROGEST SERPL-MCNC: 14.1 NG/ML

## 2024-04-22 ENCOUNTER — PATIENT MESSAGE (OUTPATIENT)
Dept: OBSTETRICS AND GYNECOLOGY | Facility: CLINIC | Age: 29
End: 2024-04-22
Payer: COMMERCIAL

## 2024-04-22 DIAGNOSIS — N97.0 INFERTILITY ASSOCIATED WITH ANOVULATION: ICD-10-CM

## 2024-04-30 RX ORDER — LETROZOLE 2.5 MG/1
5 TABLET, FILM COATED ORAL DAILY
Qty: 14 TABLET | Refills: 1 | Status: SHIPPED | OUTPATIENT
Start: 2024-04-30

## 2024-05-21 ENCOUNTER — TELEPHONE (OUTPATIENT)
Dept: OBSTETRICS AND GYNECOLOGY | Facility: CLINIC | Age: 29
End: 2024-05-21
Payer: COMMERCIAL

## 2024-05-21 DIAGNOSIS — N97.0 INFERTILITY ASSOCIATED WITH ANOVULATION: ICD-10-CM

## 2024-05-28 ENCOUNTER — LAB (OUTPATIENT)
Dept: LAB | Facility: LAB | Age: 29
End: 2024-05-28
Payer: COMMERCIAL

## 2024-05-28 DIAGNOSIS — N97.0 INFERTILITY ASSOCIATED WITH ANOVULATION: ICD-10-CM

## 2024-05-28 LAB — PROGEST SERPL-MCNC: 13.3 NG/ML

## 2024-05-28 PROCEDURE — 84144 ASSAY OF PROGESTERONE: CPT

## 2024-05-28 PROCEDURE — 36415 COLL VENOUS BLD VENIPUNCTURE: CPT

## 2024-06-18 ENCOUNTER — APPOINTMENT (OUTPATIENT)
Dept: OBSTETRICS AND GYNECOLOGY | Facility: CLINIC | Age: 29
End: 2024-06-18
Payer: COMMERCIAL

## 2024-06-18 ENCOUNTER — LAB (OUTPATIENT)
Dept: LAB | Facility: LAB | Age: 29
End: 2024-06-18
Payer: COMMERCIAL

## 2024-06-18 VITALS — SYSTOLIC BLOOD PRESSURE: 122 MMHG | DIASTOLIC BLOOD PRESSURE: 82 MMHG | WEIGHT: 215 LBS | BODY MASS INDEX: 34.7 KG/M2

## 2024-06-18 DIAGNOSIS — Z32.01 PREGNANCY TEST POSITIVE (HHS-HCC): ICD-10-CM

## 2024-06-18 DIAGNOSIS — Z32.01 PREGNANCY TEST POSITIVE (HHS-HCC): Primary | ICD-10-CM

## 2024-06-18 PROBLEM — Z67.91 RH NEGATIVE STATE IN ANTEPARTUM PERIOD (HHS-HCC): Status: ACTIVE | Noted: 2024-06-18

## 2024-06-18 PROBLEM — O26.899 RH NEGATIVE STATE IN ANTEPARTUM PERIOD (HHS-HCC): Status: ACTIVE | Noted: 2024-06-18

## 2024-06-18 LAB
ABO GROUP (TYPE) IN BLOOD: NORMAL
AMPHETAMINES UR QL SCN: NORMAL
ANTIBODY SCREEN: NORMAL
B-HCG SERPL-ACNC: ABNORMAL MIU/ML
BARBITURATES UR QL SCN: NORMAL
BENZODIAZ UR QL SCN: NORMAL
BZE UR QL SCN: NORMAL
CANNABINOIDS UR QL SCN: NORMAL
ERYTHROCYTE [DISTWIDTH] IN BLOOD BY AUTOMATED COUNT: 13.3 % (ref 11.5–14.5)
FENTANYL+NORFENTANYL UR QL SCN: NORMAL
HBV SURFACE AG SERPL QL IA: NONREACTIVE
HCT VFR BLD AUTO: 40.7 % (ref 36–46)
HCV AB SER QL: NONREACTIVE
HGB BLD-MCNC: 13.5 G/DL (ref 12–16)
HIV 1+2 AB+HIV1 P24 AG SERPL QL IA: NONREACTIVE
MCH RBC QN AUTO: 28.8 PG (ref 26–34)
MCHC RBC AUTO-ENTMCNC: 33.2 G/DL (ref 32–36)
MCV RBC AUTO: 87 FL (ref 80–100)
METHADONE UR QL SCN: NORMAL
NRBC BLD-RTO: 0 /100 WBCS (ref 0–0)
OPIATES UR QL SCN: NORMAL
OXYCODONE+OXYMORPHONE UR QL SCN: NORMAL
PCP UR QL SCN: NORMAL
PLATELET # BLD AUTO: 312 X10*3/UL (ref 150–450)
POC GLUCOSE, URINE: NEGATIVE MG/DL
POC PROTEIN, URINE: NEGATIVE MG/DL
PREGNANCY TEST URINE, POC: POSITIVE
RBC # BLD AUTO: 4.69 X10*6/UL (ref 4–5.2)
REFLEX ADDED, ANEMIA PANEL: NORMAL
RH FACTOR (ANTIGEN D): NORMAL
RUBV IGG SERPL IA-ACNC: 1.2 IA
RUBV IGG SERPL QL IA: POSITIVE
TREPONEMA PALLIDUM IGG+IGM AB [PRESENCE] IN SERUM OR PLASMA BY IMMUNOASSAY: NONREACTIVE
WBC # BLD AUTO: 6.3 X10*3/UL (ref 4.4–11.3)

## 2024-06-18 PROCEDURE — 83036 HEMOGLOBIN GLYCOSYLATED A1C: CPT

## 2024-06-18 PROCEDURE — 86780 TREPONEMA PALLIDUM: CPT

## 2024-06-18 PROCEDURE — 86317 IMMUNOASSAY INFECTIOUS AGENT: CPT

## 2024-06-18 PROCEDURE — 87340 HEPATITIS B SURFACE AG IA: CPT

## 2024-06-18 PROCEDURE — 86803 HEPATITIS C AB TEST: CPT

## 2024-06-18 PROCEDURE — 86900 BLOOD TYPING SEROLOGIC ABO: CPT

## 2024-06-18 PROCEDURE — 87491 CHLMYD TRACH DNA AMP PROBE: CPT

## 2024-06-18 PROCEDURE — 87086 URINE CULTURE/COLONY COUNT: CPT

## 2024-06-18 PROCEDURE — 87661 TRICHOMONAS VAGINALIS AMPLIF: CPT

## 2024-06-18 PROCEDURE — 86901 BLOOD TYPING SEROLOGIC RH(D): CPT

## 2024-06-18 PROCEDURE — 36415 COLL VENOUS BLD VENIPUNCTURE: CPT

## 2024-06-18 PROCEDURE — 87624 HPV HI-RISK TYP POOLED RSLT: CPT

## 2024-06-18 PROCEDURE — 84702 CHORIONIC GONADOTROPIN TEST: CPT

## 2024-06-18 PROCEDURE — 87389 HIV-1 AG W/HIV-1&-2 AB AG IA: CPT

## 2024-06-18 PROCEDURE — 85027 COMPLETE CBC AUTOMATED: CPT

## 2024-06-18 PROCEDURE — 80307 DRUG TEST PRSMV CHEM ANLYZR: CPT

## 2024-06-18 PROCEDURE — 86850 RBC ANTIBODY SCREEN: CPT

## 2024-06-18 PROCEDURE — 87591 N.GONORRHOEAE DNA AMP PROB: CPT

## 2024-06-18 SDOH — ECONOMIC STABILITY: TRANSPORTATION INSECURITY
IN THE PAST 12 MONTHS, HAS THE LACK OF TRANSPORTATION KEPT YOU FROM MEDICAL APPOINTMENTS OR FROM GETTING MEDICATIONS?: NO

## 2024-06-18 SDOH — ECONOMIC STABILITY: TRANSPORTATION INSECURITY
IN THE PAST 12 MONTHS, HAS LACK OF TRANSPORTATION KEPT YOU FROM MEETINGS, WORK, OR FROM GETTING THINGS NEEDED FOR DAILY LIVING?: NO

## 2024-06-18 ASSESSMENT — EDINBURGH POSTNATAL DEPRESSION SCALE (EPDS)
I HAVE FELT SAD OR MISERABLE: NO, NOT AT ALL
I HAVE BEEN ANXIOUS OR WORRIED FOR NO GOOD REASON: YES, SOMETIMES
I HAVE FELT SCARED OR PANICKY FOR NO GOOD REASON: NO, NOT AT ALL
TOTAL SCORE: 3
THE THOUGHT OF HARMING MYSELF HAS OCCURRED TO ME: NEVER
I HAVE LOOKED FORWARD WITH ENJOYMENT TO THINGS: AS MUCH AS I EVER DID
I HAVE BEEN ABLE TO LAUGH AND SEE THE FUNNY SIDE OF THINGS: AS MUCH AS I ALWAYS COULD
I HAVE BEEN SO UNHAPPY THAT I HAVE BEEN CRYING: NO, NEVER
I HAVE BEEN SO UNHAPPY THAT I HAVE HAD DIFFICULTY SLEEPING: NOT AT ALL
THINGS HAVE BEEN GETTING ON TOP OF ME: NO, MOST OF THE TIME I HAVE COPED QUITE WELL
I HAVE BLAMED MYSELF UNNECESSARILY WHEN THINGS WENT WRONG: NO, NEVER

## 2024-06-18 ASSESSMENT — SOCIAL DETERMINANTS OF HEALTH (SDOH)

## 2024-06-18 NOTE — PROGRESS NOTES
Subjective   Patient ID [id number]   Tabby Castano is a 28 y.o.  at 7w4d with a working estimated date of delivery of 2025, by Last Menstrual Period who presents for an initial prenatal visit. This pregnancy is planned.  History of infertility last 1.5 years, tried Clomid then Letrazole.  Pregnant on Letrazole 5 mg.   Positive breast tenderness, no other symptoms.  Some cramping.    Happy.      Her pregnancy is complicated by:  Infertility  Obesity    OB History    Para Term  AB Living   1 0 0 0 0 0   SAB IAB Ectopic Multiple Live Births   0 0 0 0 0      # Outcome Date GA Lbr Dante/2nd Weight Sex Delivery Anes PTL Lv   1 Current              Hurricane  Depression Scale Total: 3    Objective   Physical Exam  Weight: 97.5 kg (215 lb)  Expected Total Weight Gain: Could not be calculated   Pregravid BMI: Could not be calculated  BP: 122/82          OBGyn Exam    Prenatal Labs  Pending will check QBHCG x 2 levels.     Assessment/Plan   Problem List Items Addressed This Visit    None  Visit Diagnoses         Codes    Pregnancy test positive ([facility])    -  Primary Z32.01    Relevant Orders    CBC Anemia Panel With Reflex,Pregnancy    Drug Screen, Urine With Reflex to Confirmation    Hepatitis B Surface Antigen    Hepatitis C Antibody    HIV 1/2 Antigen/Antibody Screen with Reflex to Confirmation    Myriad [place] Carrier Screen    POCT pregnancy, urine manually resulted (Completed)    Rubella Antibody, IgG    Syphilis Screen with Reflex    THINPREP PAP TEST    Type And Screen    Urine Culture    US MAC OB imaging order    US OB < 14 weeks early    POCT UA (nonautomated) manually resulted (Completed)            Immunizations: N/A  Prenatal Labs ordered QBHCG x 2 levels.   Daily prenatal vitamins prescribed  First trimester screening and second trimester screening discussed. Patient decided to Proceed.   Follow up in 4 weeks for return OB visit.

## 2024-06-19 LAB
BACTERIA UR CULT: NO GROWTH
EST. AVERAGE GLUCOSE BLD GHB EST-MCNC: 91 MG/DL
HBA1C MFR BLD: 4.8 %

## 2024-06-20 LAB
C TRACH RRNA SPEC QL NAA+PROBE: NEGATIVE
N GONORRHOEA DNA SPEC QL PROBE+SIG AMP: NEGATIVE

## 2024-06-21 LAB — T VAGINALIS RRNA SPEC QL NAA+PROBE: NEGATIVE

## 2024-07-02 LAB — SCAN RESULT: NORMAL

## 2024-07-03 ENCOUNTER — ANCILLARY PROCEDURE (OUTPATIENT)
Dept: RADIOLOGY | Facility: HOSPITAL | Age: 29
End: 2024-07-03
Payer: COMMERCIAL

## 2024-07-03 DIAGNOSIS — Z32.01 PREGNANCY TEST POSITIVE (HHS-HCC): ICD-10-CM

## 2024-07-03 LAB
CYTOLOGY CMNT CVX/VAG CYTO-IMP: NORMAL
HPV HR 12 DNA GENITAL QL NAA+PROBE: NEGATIVE
HPV HR GENOTYPES PNL CVX NAA+PROBE: NEGATIVE
HPV16 DNA SPEC QL NAA+PROBE: NEGATIVE
HPV18 DNA SPEC QL NAA+PROBE: NEGATIVE
LAB AP HPV GENOTYPE QUESTION: YES
LAB AP HPV HR: NORMAL
LAB AP PAP ADDITIONAL TESTS: NORMAL
LABORATORY COMMENT REPORT: NORMAL
MENSTRUAL HX REPORTED: NORMAL
PATH REPORT.TOTAL CANCER: NORMAL

## 2024-07-03 PROCEDURE — 76801 OB US < 14 WKS SINGLE FETUS: CPT | Performed by: OBSTETRICS & GYNECOLOGY

## 2024-07-03 PROCEDURE — 76801 OB US < 14 WKS SINGLE FETUS: CPT

## 2024-07-05 ENCOUNTER — TELEPHONE (OUTPATIENT)
Dept: OBSTETRICS AND GYNECOLOGY | Facility: CLINIC | Age: 29
End: 2024-07-05
Payer: COMMERCIAL

## 2024-07-05 NOTE — TELEPHONE ENCOUNTER
----- Message from Stepan Mccall MD sent at 7/4/2024  9:13 AM EDT -----  Repeat pap in 1 year  ----- Message -----  From: Lori Boyer RN  Sent: 6/18/2024   9:08 AM EDT  To: Stepan Mccall MD

## 2024-07-11 ENCOUNTER — TELEPHONE (OUTPATIENT)
Dept: OBSTETRICS AND GYNECOLOGY | Facility: CLINIC | Age: 29
End: 2024-07-11
Payer: COMMERCIAL

## 2024-07-11 NOTE — TELEPHONE ENCOUNTER
Patient would like to know if it is necessary for her to continue taking metformin? although she states she stopped taking this medication about one week ago due to making her not feel well. she states she started this to help get pregnant, she is currently pregnant. Please advise

## 2024-07-22 ENCOUNTER — APPOINTMENT (OUTPATIENT)
Dept: LAB | Facility: LAB | Age: 29
End: 2024-07-22
Payer: COMMERCIAL

## 2024-07-22 ENCOUNTER — APPOINTMENT (OUTPATIENT)
Dept: OBSTETRICS AND GYNECOLOGY | Facility: CLINIC | Age: 29
End: 2024-07-22
Payer: COMMERCIAL

## 2024-07-22 VITALS — BODY MASS INDEX: 34.86 KG/M2 | DIASTOLIC BLOOD PRESSURE: 74 MMHG | WEIGHT: 216 LBS | SYSTOLIC BLOOD PRESSURE: 106 MMHG

## 2024-07-22 DIAGNOSIS — Z3A.11 11 WEEKS GESTATION OF PREGNANCY (HHS-HCC): Primary | ICD-10-CM

## 2024-07-22 LAB
POC GLUCOSE, URINE: NEGATIVE MG/DL
POC PROTEIN, URINE: NEGATIVE MG/DL

## 2024-07-22 NOTE — PROGRESS NOTES
"Subjective   Patient ID [id number]   Tabby Castano is a 28 y.o.  at 11w1d with a working estimated date of delivery of 2025, by Ultrasound who presents for a routine prenatal visit. She denies vaginal bleeding, leakage of fluid, decreased fetal movements, and contractions.  Doing well.      Her pregnancy is complicated by:  PCOS    Objective   Physical Exam  Weight: 98 kg (216 lb), Pregravid BMI: Could not be calculated  Expected Total Weight Gain: Could not be calculated   BP: 106/74  Fetal Heart Rate: 160      Prenatal Labs  Urine dip:  No results found for: \"KETONESU\", \"GLUCOSEUR\", \"LEUKOCYTESUR\"  Lab Results   Component Value Date    HGB 13.5 2024    HCT 40.7 2024    ABO AB 2024    HEPBSAG Nonreactive 2024     No results found for: \"PAPPA\", \"AFP\", \"HCG\", \"ESTRIOL\", \"INHBA\"    Imaging  The most recent ultrasound was performed on The most recent ultrasound study is not finalized with a study GA of The most recent ultrasound study is not finalized.  The most recent ultrasound study is not finalized  The most recent ultrasound study is not finalized    Assessment/Plan   Problem List Items Addressed This Visit    None  Visit Diagnoses         Codes    11 weeks gestation of pregnancy ([facility])    -  Primary Z3A.11    Relevant Orders    POCT UA Automated manually resulted (Completed)    Myriad Prequel Prenatal Screen            Continue prenatal vitamin.  She says Metformin makes her sick a lot of stomach cramps.   I told her okay to stop taking it.     Labs reviewed.  Cell Free DNA test today.   Order placed for anatomy scan at 20 weeks.  Follow up in 4 weeks for a routine prenatal visit.  "

## 2024-07-31 LAB — SCAN RESULT: NORMAL

## 2024-08-05 ENCOUNTER — PROCEDURE VISIT (OUTPATIENT)
Dept: RADIOLOGY | Facility: HOSPITAL | Age: 29
End: 2024-08-05
Payer: COMMERCIAL

## 2024-08-05 DIAGNOSIS — Z32.01 PREGNANCY TEST POSITIVE (HHS-HCC): ICD-10-CM

## 2024-08-05 PROCEDURE — 76816 OB US FOLLOW-UP PER FETUS: CPT | Performed by: OBSTETRICS & GYNECOLOGY

## 2024-08-05 PROCEDURE — 76816 OB US FOLLOW-UP PER FETUS: CPT

## 2024-08-09 ENCOUNTER — TELEPHONE (OUTPATIENT)
Dept: OBSTETRICS AND GYNECOLOGY | Facility: CLINIC | Age: 29
End: 2024-08-09
Payer: COMMERCIAL

## 2024-08-09 NOTE — TELEPHONE ENCOUNTER
SH PT called with c/o pruple nail bed on her feet and hands, denies SOB. Wants to speak to a nurse.

## 2024-08-16 ENCOUNTER — LAB (OUTPATIENT)
Dept: LAB | Facility: LAB | Age: 29
End: 2024-08-16
Payer: COMMERCIAL

## 2024-08-16 ENCOUNTER — APPOINTMENT (OUTPATIENT)
Dept: OBSTETRICS AND GYNECOLOGY | Facility: CLINIC | Age: 29
End: 2024-08-16
Payer: COMMERCIAL

## 2024-08-16 VITALS — BODY MASS INDEX: 34.99 KG/M2 | SYSTOLIC BLOOD PRESSURE: 120 MMHG | WEIGHT: 216.8 LBS | DIASTOLIC BLOOD PRESSURE: 70 MMHG

## 2024-08-16 DIAGNOSIS — O26.812 PREGNANCY RELATED FATIGUE IN SECOND TRIMESTER (HHS-HCC): ICD-10-CM

## 2024-08-16 DIAGNOSIS — Z3A.14 14 WEEKS GESTATION OF PREGNANCY (HHS-HCC): ICD-10-CM

## 2024-08-16 DIAGNOSIS — O26.812 PREGNANCY RELATED FATIGUE IN SECOND TRIMESTER (HHS-HCC): Primary | ICD-10-CM

## 2024-08-16 LAB
ALBUMIN SERPL BCP-MCNC: 3.6 G/DL (ref 3.4–5)
ALP SERPL-CCNC: 34 U/L (ref 33–110)
ALT SERPL W P-5'-P-CCNC: 20 U/L (ref 7–45)
ANION GAP SERPL CALC-SCNC: 10 MMOL/L (ref 10–20)
AST SERPL W P-5'-P-CCNC: 13 U/L (ref 9–39)
BILIRUB SERPL-MCNC: 0.4 MG/DL (ref 0–1.2)
BUN SERPL-MCNC: 6 MG/DL (ref 6–23)
CALCIUM SERPL-MCNC: 9.1 MG/DL (ref 8.6–10.3)
CHLORIDE SERPL-SCNC: 107 MMOL/L (ref 98–107)
CO2 SERPL-SCNC: 23 MMOL/L (ref 21–32)
CREAT SERPL-MCNC: 0.62 MG/DL (ref 0.5–1.05)
EGFRCR SERPLBLD CKD-EPI 2021: >90 ML/MIN/1.73M*2
FERRITIN SERPL-MCNC: 68 NG/ML (ref 8–150)
GLUCOSE SERPL-MCNC: 99 MG/DL (ref 74–99)
IRON SATN MFR SERPL: 20 %
IRON SERPL-MCNC: 79 UG/DL
POC GLUCOSE, URINE: NEGATIVE MG/DL
POC PROTEIN, URINE: NEGATIVE MG/DL
POTASSIUM SERPL-SCNC: 3.8 MMOL/L (ref 3.5–5.3)
PROT SERPL-MCNC: 6.1 G/DL (ref 6.4–8.2)
SODIUM SERPL-SCNC: 136 MMOL/L (ref 136–145)
TIBC SERPL-MCNC: 396 UG/DL
TSH SERPL-ACNC: 1.09 MIU/L (ref 0.44–3.98)
UIBC SERPL-MCNC: 317 UG/DL

## 2024-08-16 PROCEDURE — 82728 ASSAY OF FERRITIN: CPT

## 2024-08-16 PROCEDURE — 84443 ASSAY THYROID STIM HORMONE: CPT

## 2024-08-16 PROCEDURE — 80053 COMPREHEN METABOLIC PANEL: CPT

## 2024-08-16 PROCEDURE — 83550 IRON BINDING TEST: CPT

## 2024-08-16 PROCEDURE — 36415 COLL VENOUS BLD VENIPUNCTURE: CPT

## 2024-08-16 NOTE — PROGRESS NOTES
"Subjective   Patient ID [id number]   Tabby Castano is a 28 y.o.  at 14w5d with a working estimated date of delivery of 2025, by Ultrasound who presents for a routine prenatal visit. She denies vaginal bleeding, leakage of fluid, decreased fetal movements, or contractions.  Doing fine c/o dizzy, and fingers are purple.   Noo bleeding no vomiting.     Her pregnancy is complicated by:  Obesity  PCOS  Rh negative    Objective   Physical Exam  Weight: 98.3 kg (216 lb 12.8 oz)  Expected Total Weight Gain: Could not be calculated   Pregravid BMI: Could not be calculated  BP: 120/70  Fetal Heart Rate: 169 Fundal Height (cm): 14 cm    Prenatal Labs  Urine dip:  No results found for: \"KETONESU\", \"GLUCOSEUR\", \"LEUKOCYTESUR\"    Lab Results   Component Value Date    HGB 13.5 2024    HCT 40.7 2024    ABO AB 2024    HEPBSAG Nonreactive 2024     No results found for: \"PAPPA\", \"AFP\", \"HCG\", \"ESTRIOL\", \"INHBA\"  No results found for: \"GLUF\", \"GLUT1\", \"MOCQGZE3VF\", \"ELLYHUI5AF\"    Imaging  The most recent ultrasound was performed on The most recent ultrasound study is not finalized with a study GA of The most recent ultrasound study is not finalized and EFW of The most recent ultrasound study is not finalized.  The most recent ultrasound study is not finalized  The most recent ultrasound study is not finalized    Assessment/Plan   Problem List Items Addressed This Visit    None  Visit Diagnoses         Codes    Pregnancy related fatigue in second trimester ([facility])    -  Primary O26.812    Relevant Orders    Comprehensive Metabolic Panel    TSH with reflex to Free T4 if abnormal    Iron + TIBC Pregnancy    Ferritin    POCT UA Automated manually resulted (Completed)    14 weeks gestation of pregnancy ([facility])     Z3A.14            Continue prenatal vitamin.  Labs reviewed.  Rhogam at 28 weeks  GTT at 24-28 weeks.  Encouraged fluid intake, mineral drinks encouraged. Slow change of position " discussed. I discussed symptoms of second trimester, per her request some labs ordered.     Follow up in 4 weeks for a routine prenatal visit.

## 2024-09-03 DIAGNOSIS — Z32.01 PREGNANCY TEST POSITIVE (HHS-HCC): ICD-10-CM

## 2024-09-03 RX ORDER — PRENATAL WITH FERROUS FUM AND FOLIC ACID 3080; 920; 120; 400; 22; 1.84; 3; 20; 10; 1; 12; 200; 27; 25; 2 [IU]/1; [IU]/1; MG/1; [IU]/1; MG/1; MG/1; MG/1; MG/1; MG/1; MG/1; UG/1; MG/1; MG/1; MG/1; MG/1
1 TABLET ORAL DAILY
Qty: 30 TABLET | Refills: 11 | Status: SHIPPED | OUTPATIENT
Start: 2024-09-03 | End: 2025-09-03

## 2024-09-03 NOTE — TELEPHONE ENCOUNTER
Patient requesting refill of Prenatal Vitamin Plus Low Iron 27 mg iron- 1 mg tablet to [facility] in [place]

## 2024-09-09 ENCOUNTER — APPOINTMENT (OUTPATIENT)
Dept: OBSTETRICS AND GYNECOLOGY | Facility: CLINIC | Age: 29
End: 2024-09-09
Payer: COMMERCIAL

## 2024-09-09 VITALS — BODY MASS INDEX: 35.77 KG/M2 | SYSTOLIC BLOOD PRESSURE: 120 MMHG | DIASTOLIC BLOOD PRESSURE: 68 MMHG | WEIGHT: 221.6 LBS

## 2024-09-09 DIAGNOSIS — Z3A.18 18 WEEKS GESTATION OF PREGNANCY (HHS-HCC): Primary | ICD-10-CM

## 2024-09-09 LAB
POC GLUCOSE, URINE: NEGATIVE MG/DL
POC PROTEIN, URINE: NEGATIVE MG/DL

## 2024-09-09 PROCEDURE — 0501F PRENATAL FLOW SHEET: CPT | Performed by: OBSTETRICS & GYNECOLOGY

## 2024-09-09 NOTE — PROGRESS NOTES
"Subjective   Patient ID [id number]   Tabby Castano is a 28 y.o.  at 18w1d with a working estimated date of delivery of 2025, by Ultrasound who presents for a routine prenatal visit. She denies vaginal bleeding, leakage of fluid, decreased fetal movements, or contractions.  Doing well, c/o skin tag in genital area no iching or bleeding.       Her pregnancy is complicated by:  Overweight    Objective   Physical Exam  Weight: 101 kg (221 lb 9.6 oz)  Expected Total Weight Gain: Could not be calculated   Pregravid BMI: Could not be calculated  BP: 120/68  Fetal Heart Rate: 158    Small skin tag benign right labia majora.       Prenatal Labs  Urine dip:  No results found for: \"KETONESU\", \"GLUCOSEUR\", \"LEUKOCYTESUR\"    Lab Results   Component Value Date    HGB 13.5 2024    HCT 40.7 2024    ABO AB 2024    HEPBSAG Nonreactive 2024     No results found for: \"PAPPA\", \"AFP\", \"HCG\", \"ESTRIOL\", \"INHBA\"  No results found for: \"GLUF\", \"GLUT1\", \"CXADSGG4XN\", \"TVRYMUM6BK\"    Imaging  The most recent ultrasound was performed on The most recent ultrasound study is not finalized with a study GA of The most recent ultrasound study is not finalized and EFW of The most recent ultrasound study is not finalized.  The most recent ultrasound study is not finalized  The most recent ultrasound study is not finalized    Assessment/Plan   Problem List Items Addressed This Visit    None  Visit Diagnoses         Codes    18 weeks gestation of pregnancy ([facility])    -  Primary Z3A.18    Relevant Orders    POCT UA Automated manually resulted (Completed)            Continue prenatal vitamin.  Labs reviewed.  Rhogam at 28 weeks.   GTT at 28 weeks.  Anatomy U/S in 2-3 weeks.   Follow up in 4 weeks for a routine prenatal visit.  "

## 2024-09-24 ENCOUNTER — HOSPITAL ENCOUNTER (OUTPATIENT)
Dept: RADIOLOGY | Facility: CLINIC | Age: 29
Discharge: HOME | End: 2024-09-24
Payer: COMMERCIAL

## 2024-09-24 DIAGNOSIS — Z32.01 PREGNANCY TEST POSITIVE (HHS-HCC): ICD-10-CM

## 2024-09-24 DIAGNOSIS — O35.9XX0 MATERNAL CARE FOR (SUSPECTED) FETAL ABNORMALITY AND DAMAGE, UNSPECIFIED, NOT APPLICABLE OR UNSPECIFIED: ICD-10-CM

## 2024-09-24 PROCEDURE — 76811 OB US DETAILED SNGL FETUS: CPT

## 2024-09-24 PROCEDURE — 76811 OB US DETAILED SNGL FETUS: CPT | Performed by: OBSTETRICS & GYNECOLOGY

## 2024-10-16 ENCOUNTER — TELEPHONE (OUTPATIENT)
Dept: OBSTETRICS AND GYNECOLOGY | Facility: CLINIC | Age: 29
End: 2024-10-16

## 2024-10-16 ENCOUNTER — ANCILLARY PROCEDURE (OUTPATIENT)
Dept: RADIOLOGY | Facility: HOSPITAL | Age: 29
End: 2024-10-16
Payer: COMMERCIAL

## 2024-10-16 DIAGNOSIS — Z32.01 PREGNANCY TEST POSITIVE (HHS-HCC): ICD-10-CM

## 2024-10-16 PROCEDURE — 76816 OB US FOLLOW-UP PER FETUS: CPT

## 2024-10-16 PROCEDURE — 76816 OB US FOLLOW-UP PER FETUS: CPT | Performed by: STUDENT IN AN ORGANIZED HEALTH CARE EDUCATION/TRAINING PROGRAM

## 2024-10-16 NOTE — TELEPHONE ENCOUNTER
Patient returned call. Patient concerned that she noticed swelling in her ankles yesterday. 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 Patient did take her blood pressure and it was originally 135/84. Once she relaxed more her repeat was 119/78. Advised patient to increase oral water intake, use compression stockings, and elevate legs while sitting. Will follow up at appointment on Monday.

## 2024-10-21 ENCOUNTER — APPOINTMENT (OUTPATIENT)
Dept: OBSTETRICS AND GYNECOLOGY | Facility: CLINIC | Age: 29
End: 2024-10-21
Payer: COMMERCIAL

## 2024-10-21 VITALS — BODY MASS INDEX: 37.19 KG/M2 | DIASTOLIC BLOOD PRESSURE: 60 MMHG | WEIGHT: 230.4 LBS | SYSTOLIC BLOOD PRESSURE: 118 MMHG

## 2024-10-21 DIAGNOSIS — Z3A.24 24 WEEKS GESTATION OF PREGNANCY (HHS-HCC): Primary | ICD-10-CM

## 2024-10-21 LAB
POC GLUCOSE, URINE: NEGATIVE MG/DL
POC PROTEIN, URINE: NEGATIVE MG/DL

## 2024-10-21 PROCEDURE — 0501F PRENATAL FLOW SHEET: CPT | Performed by: OBSTETRICS & GYNECOLOGY

## 2024-10-21 ASSESSMENT — EDINBURGH POSTNATAL DEPRESSION SCALE (EPDS)
I HAVE BLAMED MYSELF UNNECESSARILY WHEN THINGS WENT WRONG: NOT VERY OFTEN
TOTAL SCORE: 3
THINGS HAVE BEEN GETTING ON TOP OF ME: NO, MOST OF THE TIME I HAVE COPED QUITE WELL
I HAVE FELT SCARED OR PANICKY FOR NO GOOD REASON: NO, NOT AT ALL
THE THOUGHT OF HARMING MYSELF HAS OCCURRED TO ME: NEVER
I HAVE BEEN ANXIOUS OR WORRIED FOR NO GOOD REASON: HARDLY EVER
I HAVE BEEN SO UNHAPPY THAT I HAVE BEEN CRYING: NO, NEVER
I HAVE BEEN ABLE TO LAUGH AND SEE THE FUNNY SIDE OF THINGS: AS MUCH AS I ALWAYS COULD
I HAVE LOOKED FORWARD WITH ENJOYMENT TO THINGS: AS MUCH AS I EVER DID
I HAVE FELT SAD OR MISERABLE: NO, NOT AT ALL
I HAVE BEEN SO UNHAPPY THAT I HAVE HAD DIFFICULTY SLEEPING: NOT AT ALL

## 2024-10-21 NOTE — PROGRESS NOTES
"Subjective   Patient ID [id number]   Tabby Castano is a 28 y.o.  at 24w1d with a working estimated date of delivery of 2025, by Ultrasound who presents for a routine prenatal visit. She denies vaginal bleeding, leakage of fluid, decreased fetal movements, or contractions.  Doing well, no Headaches.     Her pregnancy is complicated by:  Obesity    Objective   Physical Exam:   Weight: 105 kg (230 lb 6.4 oz)  Expected Total Weight Gain: Could not be calculated   Pregravid BMI: Could not be calculated  BP: 118/60  Fetal Heart Rate: 158               Prenatal Labs  Urine Dip:  No results found for: \"KETONESU\", \"GLUCOSEUR\", \"LEUKOCYTESUR\"  Lab Results   Component Value Date    HGB 13.5 2024    HCT 40.7 2024    ABO AB 2024    HEPBSAG Nonreactive 2024     No results found for: \"PAPPA\", \"AFP\", \"HCG\", \"ESTRIOL\", \"INHBA\"  No results found for: \"GLUF\", \"GLUT1\", \"JMSUFTM2SA\", \"OBTIERV5EQ\"    Imaging  The most recent ultrasound was performed on The most recent ultrasound study is not finalized with a study GA of The most recent ultrasound study is not finalized and EFW of The most recent ultrasound study is not finalized.  The most recent ultrasound study is not finalized  The most recent ultrasound study is not finalized    Assessment/Plan   Problem List Items Addressed This Visit    None  Visit Diagnoses         Codes    24 weeks gestation of pregnancy ([facility])    -  Primary Z3A.24    Relevant Orders    POCT UA Automated manually resulted (Completed)          Continue prenatal vitamin.  Labs reviewed.  Glucola test next visit.  Rhogam next visit.   GBS taken.  Expected mode of delivery Vaginal  Follow up in 4 weeks for  a routine prenatal visit.  "

## 2024-11-18 ENCOUNTER — APPOINTMENT (OUTPATIENT)
Dept: OBSTETRICS AND GYNECOLOGY | Facility: CLINIC | Age: 29
End: 2024-11-18
Payer: COMMERCIAL

## 2024-11-18 ENCOUNTER — LAB (OUTPATIENT)
Dept: LAB | Facility: LAB | Age: 29
End: 2024-11-18
Payer: COMMERCIAL

## 2024-11-18 DIAGNOSIS — Z3A.24 24 WEEKS GESTATION OF PREGNANCY (HHS-HCC): ICD-10-CM

## 2024-11-18 LAB
ABO GROUP (TYPE) IN BLOOD: NORMAL
ERYTHROCYTE [DISTWIDTH] IN BLOOD BY AUTOMATED COUNT: 14.4 % (ref 11.5–14.5)
GLUCOSE 1H P 50 G GLC PO SERPL-MCNC: 110 MG/DL
HCT VFR BLD AUTO: 36.7 % (ref 36–46)
HGB BLD-MCNC: 11.9 G/DL (ref 12–16)
MCH RBC QN AUTO: 28.1 PG (ref 26–34)
MCHC RBC AUTO-ENTMCNC: 32.4 G/DL (ref 32–36)
MCV RBC AUTO: 87 FL (ref 80–100)
NRBC BLD-RTO: 0 /100 WBCS (ref 0–0)
PLATELET # BLD AUTO: 270 X10*3/UL (ref 150–450)
RBC # BLD AUTO: 4.23 X10*6/UL (ref 4–5.2)
REFLEX ADDED, ANEMIA PANEL: NORMAL
RH FACTOR (ANTIGEN D): NORMAL
TREPONEMA PALLIDUM IGG+IGM AB [PRESENCE] IN SERUM OR PLASMA BY IMMUNOASSAY: NONREACTIVE
WBC # BLD AUTO: 9.6 X10*3/UL (ref 4.4–11.3)

## 2024-11-18 PROCEDURE — 86900 BLOOD TYPING SEROLOGIC ABO: CPT

## 2024-11-18 PROCEDURE — 36415 COLL VENOUS BLD VENIPUNCTURE: CPT

## 2024-11-18 PROCEDURE — 86780 TREPONEMA PALLIDUM: CPT

## 2024-11-18 PROCEDURE — 85027 COMPLETE CBC AUTOMATED: CPT

## 2024-11-18 PROCEDURE — 82947 ASSAY GLUCOSE BLOOD QUANT: CPT

## 2024-11-18 PROCEDURE — 86901 BLOOD TYPING SEROLOGIC RH(D): CPT

## 2024-11-21 ENCOUNTER — APPOINTMENT (OUTPATIENT)
Dept: OBSTETRICS AND GYNECOLOGY | Facility: CLINIC | Age: 29
End: 2024-11-21

## 2024-11-22 ENCOUNTER — APPOINTMENT (OUTPATIENT)
Dept: OBSTETRICS AND GYNECOLOGY | Facility: CLINIC | Age: 29
End: 2024-11-22

## 2024-11-22 VITALS — WEIGHT: 236 LBS | BODY MASS INDEX: 38.09 KG/M2 | SYSTOLIC BLOOD PRESSURE: 122 MMHG | DIASTOLIC BLOOD PRESSURE: 70 MMHG

## 2024-11-22 DIAGNOSIS — Z23 NEED FOR TDAP VACCINATION: ICD-10-CM

## 2024-11-22 DIAGNOSIS — Z29.13 NEED FOR RHOGAM DUE TO RH NEGATIVE MOTHER: Primary | ICD-10-CM

## 2024-11-22 DIAGNOSIS — Z3A.28 28 WEEKS GESTATION OF PREGNANCY (HHS-HCC): ICD-10-CM

## 2024-11-22 LAB
POC GLUCOSE, URINE: NEGATIVE MG/DL
POC PROTEIN, URINE: NEGATIVE MG/DL

## 2024-11-22 NOTE — PROGRESS NOTES
"Subjective   Patient ID [id number]   Tabby Castano is a 29 y.o.  at 28w5d with a working estimated date of delivery of 2025, by Ultrasound who presents for a routine prenatal visit. She denies vaginal bleeding, leakage of fluid, decreased fetal movements, or contractions.  Doing well.       Her pregnancy is complicated by:  Obesity    Objective   Physical Exam:   Weight: 107 kg (236 lb)  Expected Total Weight Gain: Could not be calculated   Pregravid BMI: Could not be calculated  BP: 122/70  Fetal Heart Rate: 151               Prenatal Labs  Urine Dip:  No results found for: \"KETONESU\", \"GLUCOSEUR\", \"LEUKOCYTESUR\"  Lab Results   Component Value Date    HGB 11.9 (L) 2024    HCT 36.7 2024    ABO AB 2024    HEPBSAG Nonreactive 2024     No results found for: \"PAPPA\", \"AFP\", \"HCG\", \"ESTRIOL\", \"INHBA\"  No results found for: \"GLUF\", \"GLUT1\", \"KLPLATZ7VI\", \"XNOKJVX4PT\"    Imaging  The most recent ultrasound was performed on The most recent ultrasound study is not finalized with a study GA of The most recent ultrasound study is not finalized and EFW of The most recent ultrasound study is not finalized.  The most recent ultrasound study is not finalized  The most recent ultrasound study is not finalized    Assessment/Plan   Problem List Items Addressed This Visit    None  Visit Diagnoses         Codes    28 weeks gestation of pregnancy ([facility])    -  Primary Z3A.28    Relevant Orders    POCT UA Automated manually resulted (Completed)          Continue prenatal vitamin.  Labs reviewed.  GDM normal,  Rhogam and Tdap  today.     GBS N/A.   Expected mode of delivery Vaginal  Follow up in 2 weeks  for a routine prenatal visit.  "

## 2024-12-02 ENCOUNTER — ANCILLARY PROCEDURE (OUTPATIENT)
Dept: RADIOLOGY | Facility: HOSPITAL | Age: 29
End: 2024-12-02
Payer: COMMERCIAL

## 2024-12-02 ENCOUNTER — APPOINTMENT (OUTPATIENT)
Dept: OBSTETRICS AND GYNECOLOGY | Facility: CLINIC | Age: 29
End: 2024-12-02
Payer: COMMERCIAL

## 2024-12-02 VITALS — SYSTOLIC BLOOD PRESSURE: 116 MMHG | WEIGHT: 241 LBS | BODY MASS INDEX: 38.9 KG/M2 | DIASTOLIC BLOOD PRESSURE: 68 MMHG

## 2024-12-02 DIAGNOSIS — Z32.01 PREGNANCY TEST POSITIVE (HHS-HCC): ICD-10-CM

## 2024-12-02 DIAGNOSIS — O99.213 OBESITY AFFECTING PREGNANCY IN THIRD TRIMESTER (HHS-HCC): ICD-10-CM

## 2024-12-02 DIAGNOSIS — Z3A.30 30 WEEKS GESTATION OF PREGNANCY (HHS-HCC): Primary | ICD-10-CM

## 2024-12-02 PROCEDURE — 76819 FETAL BIOPHYS PROFIL W/O NST: CPT | Performed by: STUDENT IN AN ORGANIZED HEALTH CARE EDUCATION/TRAINING PROGRAM

## 2024-12-02 PROCEDURE — 0501F PRENATAL FLOW SHEET: CPT | Performed by: OBSTETRICS & GYNECOLOGY

## 2024-12-02 PROCEDURE — 76816 OB US FOLLOW-UP PER FETUS: CPT | Performed by: STUDENT IN AN ORGANIZED HEALTH CARE EDUCATION/TRAINING PROGRAM

## 2024-12-02 PROCEDURE — 76819 FETAL BIOPHYS PROFIL W/O NST: CPT

## 2024-12-02 PROCEDURE — 76816 OB US FOLLOW-UP PER FETUS: CPT

## 2024-12-02 NOTE — PROGRESS NOTES
"Subjective   Patient ID [id number]   Tabby Castano is a 29 y.o.  at 30w1d with a working estimated date of delivery of 2025, by Ultrasound who presents for a routine prenatal visit. She denies vaginal bleeding, leakage of fluid, decreased fetal movements, or contractions.  Doing well.     Her pregnancy is complicated by:  Obesity    Objective   Physical Exam:   Weight: 109 kg (241 lb)  Expected Total Weight Gain: 5 kg (11 lb)-9 kg (19 lb)   Pregravid BMI: 32.30  BP: 116/68  Fetal Heart Rate: 138               Prenatal Labs  Urine Dip:  No results found for: \"KETONESU\", \"GLUCOSEUR\", \"LEUKOCYTESUR\"  Lab Results   Component Value Date    HGB 11.9 (L) 2024    HCT 36.7 2024    ABO AB 2024    HEPBSAG Nonreactive 2024     No results found for: \"PAPPA\", \"AFP\", \"HCG\", \"ESTRIOL\", \"INHBA\"  No results found for: \"GLUF\", \"GLUT1\", \"OQUTTSH4EA\", \"UODFZXP0KX\"    Imaging  The most recent ultrasound was performed on The most recent ultrasound study is not finalized with a study GA of The most recent ultrasound study is not finalized and EFW of The most recent ultrasound study is not finalized.  The most recent ultrasound study is not finalized  The most recent ultrasound study is not finalized    Assessment/Plan   Problem List Items Addressed This Visit    None  Visit Diagnoses         Codes    30 weeks gestation of pregnancy ([facility])    -  Primary Z3A.30          Continue prenatal vitamin.  Labs reviewed.  GBS N/A.   Expected mode of delivery Vaginal  Follow up in 2  week for a routine prenatal visit.  "

## 2024-12-02 NOTE — PROGRESS NOTES
"Subjective   Patient ID [id number]   Tabby Castano is a 29 y.o.  at 30w1d with a working estimated date of delivery of 2025, by Ultrasound who presents for a routine prenatal visit. She denies vaginal bleeding, leakage of fluid, decreased fetal movements, or contractions.    Her pregnancy is complicated by:  Obesity    Objective   Physical Exam:   Weight: 109 kg (241 lb)  Expected Total Weight Gain: 5 kg (11 lb)-9 kg (19 lb)   Pregravid BMI: 32.30  BP: 116/68                  Prenatal Labs  Urine Dip:  No results found for: \"KETONESU\", \"GLUCOSEUR\", \"LEUKOCYTESUR\"  Lab Results   Component Value Date    HGB 11.9 (L) 2024    HCT 36.7 2024    ABO AB 2024    HEPBSAG Nonreactive 2024     No results found for: \"PAPPA\", \"AFP\", \"HCG\", \"ESTRIOL\", \"INHBA\"  No results found for: \"GLUF\", \"GLUT1\", \"TOQQDZA7QF\", \"OJKQKXI0TW\"    Imaging  The most recent ultrasound was performed on The most recent ultrasound study is not finalized with a study GA of The most recent ultrasound study is not finalized and EFW of The most recent ultrasound study is not finalized.  The most recent ultrasound study is not finalized  The most recent ultrasound study is not finalized    Assessment/Plan   Problem List Items Addressed This Visit    None  Visit Diagnoses         Codes    30 weeks gestation of pregnancy ([facility])    -  Primary Z3A.30          Continue prenatal vitamin.  Labs reviewed.  GBS taken.  Expected mode of delivery Vaginal  Follow up in 1 week for a routine prenatal visit.Subjective   Patient ID [id number]   Tabby Castano is a 29 y.o.  at 30w1d with a working estimated date of delivery of 2025, by Ultrasound who presents for a routine prenatal visit. She denies vaginal bleeding, leakage of fluid, decreased fetal movements, or contractions.    Her pregnancy is complicated by:  Obesity    Objective   Physical Exam  Weight: 109 kg (241 lb)  Expected Total Weight Gain: 5 kg (11 lb)-9 kg " "(19 lb)   Pregravid BMI: 32.30  BP: 116/68         Prenatal Labs  Urine dip:  No results found for: \"KETONESU\", \"GLUCOSEUR\", \"LEUKOCYTESUR\"    Lab Results   Component Value Date    HGB 11.9 (L) 11/18/2024    HCT 36.7 11/18/2024    ABO AB 11/18/2024    HEPBSAG Nonreactive 06/18/2024     No results found for: \"PAPPA\", \"AFP\", \"HCG\", \"ESTRIOL\", \"INHBA\"  No results found for: \"GLUF\", \"GLUT1\", \"BAWJICB5MK\", \"PEOQDHN6UB\"    Imaging  The most recent ultrasound was performed on The most recent ultrasound study is not finalized with a study GA of The most recent ultrasound study is not finalized and EFW of The most recent ultrasound study is not finalized.  The most recent ultrasound study is not finalized  The most recent ultrasound study is not finalized    Assessment/Plan   Problem List Items Addressed This Visit    None  Visit Diagnoses         Codes    30 weeks gestation of pregnancy ([facility])    -  Primary Z3A.30            Continue prenatal vitamin.  Labs reviewed.  Rhogam Done  GTT Negative. .  Follow up in 2 weeks for a routine prenatal visit.  "

## 2024-12-16 ENCOUNTER — APPOINTMENT (OUTPATIENT)
Dept: OBSTETRICS AND GYNECOLOGY | Facility: CLINIC | Age: 29
End: 2024-12-16
Payer: COMMERCIAL

## 2024-12-16 VITALS — SYSTOLIC BLOOD PRESSURE: 110 MMHG | BODY MASS INDEX: 38.9 KG/M2 | WEIGHT: 241 LBS | DIASTOLIC BLOOD PRESSURE: 60 MMHG

## 2024-12-16 DIAGNOSIS — Z3A.32 32 WEEKS GESTATION OF PREGNANCY (HHS-HCC): Primary | ICD-10-CM

## 2024-12-16 LAB
POC GLUCOSE, URINE: NEGATIVE MG/DL
POC PROTEIN, URINE: NEGATIVE MG/DL

## 2024-12-16 PROCEDURE — 0501F PRENATAL FLOW SHEET: CPT | Performed by: OBSTETRICS & GYNECOLOGY

## 2024-12-30 ENCOUNTER — APPOINTMENT (OUTPATIENT)
Dept: OBSTETRICS AND GYNECOLOGY | Facility: CLINIC | Age: 29
End: 2024-12-30
Payer: COMMERCIAL

## 2024-12-30 VITALS — SYSTOLIC BLOOD PRESSURE: 120 MMHG | DIASTOLIC BLOOD PRESSURE: 80 MMHG | BODY MASS INDEX: 39.19 KG/M2 | WEIGHT: 242.8 LBS

## 2024-12-30 DIAGNOSIS — Z3A.34 34 WEEKS GESTATION OF PREGNANCY (HHS-HCC): Primary | ICD-10-CM

## 2024-12-30 LAB
POC GLUCOSE, URINE: NEGATIVE MG/DL
POC GLUCOSE, URINE: NEGATIVE MG/DL
POC PROTEIN, URINE: NEGATIVE MG/DL
POC PROTEIN, URINE: NEGATIVE MG/DL

## 2024-12-30 PROCEDURE — 0501F PRENATAL FLOW SHEET: CPT | Performed by: OBSTETRICS & GYNECOLOGY

## 2024-12-30 NOTE — PROGRESS NOTES
"Subjective   Patient ID [id number]   Tabby Castano is a 29 y.o.  at 34w1d with a working estimated date of delivery of 2025, by Ultrasound who presents for a routine prenatal visit. She denies vaginal bleeding, leakage of fluid, decreased fetal movements, or contractions.  Had some sharp gas pain  2 days ago on and off throughout the day but resolved now.  No spotting, feels the baby is very low very uncomfortable.  No LOF.  NO headaches.     Her pregnancy is complicated by:  Obesity    Objective   Physical Exam:   Weight: 110 kg (242 lb 12.8 oz)  Expected Total Weight Gain: 5 kg (11 lb)-9 kg (19 lb)   Pregravid BMI: 32.30  BP: 120/80  Fetal Heart Rate: 154               Prenatal Labs  Urine Dip:  No results found for: \"KETONESU\", \"GLUCOSEUR\", \"LEUKOCYTESUR\"  Lab Results   Component Value Date    HGB 11.9 (L) 2024    HCT 36.7 2024    ABO AB 2024    HEPBSAG Nonreactive 2024     No results found for: \"PAPPA\", \"AFP\", \"HCG\", \"ESTRIOL\", \"INHBA\"  No results found for: \"GLUF\", \"GLUT1\", \"PHZDPDO1IV\", \"RSNDUUN6KT\"    Imaging  The most recent ultrasound was performed on The most recent ultrasound study is not finalized with a study GA of The most recent ultrasound study is not finalized and EFW of The most recent ultrasound study is not finalized.  The most recent ultrasound study is not finalized  The most recent ultrasound study is not finalized    Assessment/Plan   Problem List Items Addressed This Visit             ICD-10-CM    34 weeks gestation of pregnancy ([facility]) - Primary Z3A.34    Relevant Orders    POCT UA Automated manually resulted (Completed)     Continue prenatal vitamin.  For RSV shot soon.   Labs reviewed.  GBS next visit.   Expected mode of delivery Vaginal.    Follow up in 2  weeks for a routine prenatal visit.  "

## 2025-01-10 ENCOUNTER — APPOINTMENT (OUTPATIENT)
Dept: OBSTETRICS AND GYNECOLOGY | Facility: CLINIC | Age: 30
End: 2025-01-10
Payer: COMMERCIAL

## 2025-01-13 ENCOUNTER — APPOINTMENT (OUTPATIENT)
Dept: OBSTETRICS AND GYNECOLOGY | Facility: CLINIC | Age: 30
End: 2025-01-13
Payer: COMMERCIAL

## 2025-01-13 VITALS — WEIGHT: 248.2 LBS | SYSTOLIC BLOOD PRESSURE: 132 MMHG | DIASTOLIC BLOOD PRESSURE: 80 MMHG | BODY MASS INDEX: 40.06 KG/M2

## 2025-01-13 DIAGNOSIS — Z3A.36 36 WEEKS GESTATION OF PREGNANCY (HHS-HCC): ICD-10-CM

## 2025-01-13 LAB
POC GLUCOSE, URINE: NEGATIVE MG/DL
POC PROTEIN, URINE: NEGATIVE MG/DL

## 2025-01-13 PROCEDURE — 0501F PRENATAL FLOW SHEET: CPT | Performed by: OBSTETRICS & GYNECOLOGY

## 2025-01-13 PROCEDURE — 87081 CULTURE SCREEN ONLY: CPT

## 2025-01-13 NOTE — PROGRESS NOTES
"Subjective   Patient ID [id number]   Tabby Castano is a 29 y.o.  at 36w1d with a working estimated date of delivery of 2025, by Ultrasound who presents for a routine prenatal visit. She denies vaginal bleeding, leakage of fluid, decreased fetal movements, or contractions.  Doing well says had 1 mild headache on Friday and resolved with Tylenol.  She otherwise is feeling good.    Her pregnancy is complicated by:  Obesity    Objective   Physical Exam:   Weight: 113 kg (248 lb 3.2 oz)  Expected Total Weight Gain: 5 kg (11 lb)-9 kg (19 lb)   Pregravid BMI: 32.30  BP: 132/80  Fetal Heart Rate: 155 Fundal Height (cm): 36 cm             Prenatal Labs  Urine Dip:  No results found for: \"KETONESU\", \"GLUCOSEUR\", \"LEUKOCYTESUR\"  Lab Results   Component Value Date    HGB 11.9 (L) 2024    HCT 36.7 2024    ABO AB 2024    HEPBSAG Nonreactive 2024     No results found for: \"PAPPA\", \"AFP\", \"HCG\", \"ESTRIOL\", \"INHBA\"  No results found for: \"GLUF\", \"GLUT1\", \"IURPBMP9IP\", \"NQPMYEG7WY\"    Imaging  The most recent ultrasound was performed on The most recent ultrasound study is not finalized with a study GA of The most recent ultrasound study is not finalized and EFW of The most recent ultrasound study is not finalized.  The most recent ultrasound study is not finalized  The most recent ultrasound study is not finalized    Assessment/Plan   Problem List Items Addressed This Visit             ICD-10-CM    36 weeks gestation of pregnancy ([facility]) Z3A.36    Relevant Orders    POCT UA Automated manually resulted (Completed)     Continue prenatal vitamin.  I have encouraged her to call with any severe headaches or headaches that does not go away with Tylenol.  She is scheduled for ultrasound this Wednesday.  She will follow-up with us weekly.  She has declined RSV vaccination.  Labs reviewed.  GBS taken.  Expected mode of delivery Vaginal.  ollow up in 1 week for a routine prenatal visit.  Induction at 39 " weeks.

## 2025-01-15 ENCOUNTER — ANCILLARY PROCEDURE (OUTPATIENT)
Dept: RADIOLOGY | Facility: HOSPITAL | Age: 30
End: 2025-01-15
Payer: COMMERCIAL

## 2025-01-15 DIAGNOSIS — Z32.01 PREGNANCY TEST POSITIVE (HHS-HCC): ICD-10-CM

## 2025-01-15 PROCEDURE — 76816 OB US FOLLOW-UP PER FETUS: CPT | Performed by: OBSTETRICS & GYNECOLOGY

## 2025-01-15 PROCEDURE — 76816 OB US FOLLOW-UP PER FETUS: CPT

## 2025-01-15 PROCEDURE — 76819 FETAL BIOPHYS PROFIL W/O NST: CPT | Performed by: OBSTETRICS & GYNECOLOGY

## 2025-01-15 PROCEDURE — 76819 FETAL BIOPHYS PROFIL W/O NST: CPT

## 2025-01-16 LAB — GP B STREP GENITAL QL CULT: ABNORMAL

## 2025-01-20 ENCOUNTER — APPOINTMENT (OUTPATIENT)
Dept: OBSTETRICS AND GYNECOLOGY | Facility: CLINIC | Age: 30
End: 2025-01-20
Payer: COMMERCIAL

## 2025-01-20 VITALS — WEIGHT: 247 LBS | BODY MASS INDEX: 39.87 KG/M2 | DIASTOLIC BLOOD PRESSURE: 78 MMHG | SYSTOLIC BLOOD PRESSURE: 124 MMHG

## 2025-01-20 DIAGNOSIS — Z3A.37 37 WEEKS GESTATION OF PREGNANCY (HHS-HCC): ICD-10-CM

## 2025-01-20 DIAGNOSIS — O36.63X0 EXCESSIVE FETAL GROWTH AFFECTING MANAGEMENT OF PREGNANCY IN THIRD TRIMESTER, SINGLE OR UNSPECIFIED FETUS (HHS-HCC): ICD-10-CM

## 2025-01-20 LAB
POC APPEARANCE, URINE: ABNORMAL
POC COLOR, URINE: YELLOW
POC GLUCOSE, URINE: NEGATIVE MG/DL
POC PROTEIN, URINE: NEGATIVE MG/DL

## 2025-01-20 PROCEDURE — 0501F PRENATAL FLOW SHEET: CPT | Performed by: OBSTETRICS & GYNECOLOGY

## 2025-01-20 NOTE — PROGRESS NOTES
"Subjective   Patient ID [id number]   Tabby Castano is a 29 y.o.  at 37w1d with a working estimated date of delivery of 2025, by Ultrasound who presents for a routine prenatal visit. She denies vaginal bleeding, leakage of fluid, decreased fetal movements, or contractions.  Patient concerned about baby's weight at 81 percentile on recent ultrasound.  She says all her blood pressures are normal at home 130s or less over 80s or 70s.    Her pregnancy is complicated by:  GA  Obesity    Objective   Physical Exam:   Weight: 112 kg (247 lb)  Expected Total Weight Gain: 5 kg (11 lb)-9 kg (19 lb)   Pregravid BMI: 32.30  BP: 124/78  Fetal Heart Rate: 146 Fundal Height (cm): 36 cm Presentation: Vertex  Dilation: 2 Effacement (%): 50 Fetal Station: -2    Prenatal Labs  Urine Dip:  No results found for: \"KETONESU\", \"GLUCOSEUR\", \"LEUKOCYTESUR\"  Lab Results   Component Value Date    HGB 11.9 (L) 2024    HCT 36.7 2024    ABO AB 2024    HEPBSAG Nonreactive 2024     No results found for: \"PAPPA\", \"AFP\", \"HCG\", \"ESTRIOL\", \"INHBA\"  No results found for: \"GLUF\", \"GLUT1\", \"OSSZWDX8NE\", \"CNVTTMU5VX\"    Imaging  The most recent ultrasound was performed on   with a study GA of   and EFW of  .          Assessment/Plan   Problem List Items Addressed This Visit             ICD-10-CM    37 weeks gestation of pregnancy ([facility]) Z3A.37    Relevant Orders    POCT UA Automated manually resulted (Completed)    Obesity complicating childbirth ([facility]) - Primary O99.214    Excessive fetal growth affecting management of pregnancy in third trimester ([facility]) O36.63X0     Continue prenatal vitamin.  Labs reviewed.    GBS positive will treat in labor.     Expected mode of delivery vagina   Consider induction at 39 weeks.   Follow up in 1 week for a routine prenatal visit.  "

## 2025-01-27 ENCOUNTER — APPOINTMENT (OUTPATIENT)
Dept: OBSTETRICS AND GYNECOLOGY | Facility: CLINIC | Age: 30
End: 2025-01-27
Payer: COMMERCIAL

## 2025-01-27 VITALS — WEIGHT: 247 LBS | SYSTOLIC BLOOD PRESSURE: 120 MMHG | DIASTOLIC BLOOD PRESSURE: 82 MMHG | BODY MASS INDEX: 39.87 KG/M2

## 2025-01-27 DIAGNOSIS — Z3A.38 38 WEEKS GESTATION OF PREGNANCY (HHS-HCC): ICD-10-CM

## 2025-01-27 LAB
POC GLUCOSE, URINE: NEGATIVE MG/DL
POC PROTEIN, URINE: NEGATIVE MG/DL

## 2025-01-27 PROCEDURE — 0501F PRENATAL FLOW SHEET: CPT | Performed by: OBSTETRICS & GYNECOLOGY

## 2025-01-27 NOTE — PROGRESS NOTES
"Subjective   Patient ID [id number]   Tabby Castano is a 29 y.o.  at 38w1d with a working estimated date of delivery of 2025, by Ultrasound who presents for a routine prenatal visit. She denies vaginal bleeding, leakage of fluid, decreased fetal movements, or contractions.  Doing well reports good fetal movement no labor signs.      Her pregnancy is complicated by:  Obesity    Objective   Physical Exam:   Weight: 112 kg (247 lb)  Expected Total Weight Gain: 5 kg (11 lb)-9 kg (19 lb)   Pregravid BMI: 32.30  BP: 120/82  Fetal Heart Rate: 155               Prenatal Labs  Urine Dip:  No results found for: \"KETONESU\", \"GLUCOSEUR\", \"LEUKOCYTESUR\"  Lab Results   Component Value Date    HGB 11.9 (L) 2024    HCT 36.7 2024    ABO AB 2024    HEPBSAG Nonreactive 2024     No results found for: \"PAPPA\", \"AFP\", \"HCG\", \"ESTRIOL\", \"INHBA\"  No results found for: \"GLUF\", \"GLUT1\", \"KBBYWQG2TX\", \"IKMYVYN7OT\"    Imaging  The most recent ultrasound was performed on   with a study GA of   and EFW of  .          Assessment/Plan   Problem List Items Addressed This Visit             ICD-10-CM    38 weeks gestation of pregnancy ([facility]) Z3A.38    Relevant Orders    POCT UA Automated manually resulted (Completed)     Continue prenatal vitamin.  Labs reviewed.  GBS positive  Expected mode of delivery vaginal.  Labor signs reviewed.  Follow up in 1 week for a routine prenatal visit.  "

## 2025-02-03 ENCOUNTER — APPOINTMENT (OUTPATIENT)
Dept: OBSTETRICS AND GYNECOLOGY | Facility: CLINIC | Age: 30
End: 2025-02-03
Payer: COMMERCIAL

## 2025-02-03 VITALS — SYSTOLIC BLOOD PRESSURE: 130 MMHG | WEIGHT: 252 LBS | DIASTOLIC BLOOD PRESSURE: 70 MMHG | BODY MASS INDEX: 40.67 KG/M2

## 2025-02-03 DIAGNOSIS — Z3A.39 39 WEEKS GESTATION OF PREGNANCY (HHS-HCC): ICD-10-CM

## 2025-02-03 LAB
POC GLUCOSE, URINE: NEGATIVE MG/DL
POC PROTEIN, URINE: NEGATIVE MG/DL

## 2025-02-03 PROCEDURE — 0501F PRENATAL FLOW SHEET: CPT | Performed by: OBSTETRICS & GYNECOLOGY

## 2025-02-03 NOTE — PROGRESS NOTES
"Subjective   Patient ID [id number]   Tabby Castano is a 29 y.o.  at 39w1d with a working estimated date of delivery of 2025, by Ultrasound who presents for a routine prenatal visit. She denies vaginal bleeding, leakage of fluid, decreased fetal movements, or contractions.  Doing well.  Wants to be induced.       Her pregnancy is complicated by:  Obesity    Objective   Physical Exam:   Weight: 114 kg (252 lb)  Expected Total Weight Gain: 5 kg (11 lb)-9 kg (19 lb)   Pregravid BMI: 32.30  BP: 130/70  Fetal Heart Rate: 156               Prenatal Labs  Urine Dip:  No results found for: \"KETONESU\", \"GLUCOSEUR\", \"LEUKOCYTESUR\"  Lab Results   Component Value Date    HGB 11.9 (L) 2024    HCT 36.7 2024    ABO AB 2024    HEPBSAG Nonreactive 2024     No results found for: \"PAPPA\", \"AFP\", \"HCG\", \"ESTRIOL\", \"INHBA\"  No results found for: \"GLUF\", \"GLUT1\", \"DWNOSOF1TC\", \"XWTPSJG0KH\"    Imaging  The most recent ultrasound was performed on   with a study GA of   and EFW of  .          Assessment/Plan   Problem List Items Addressed This Visit             ICD-10-CM    39 weeks gestation of pregnancy ([facility]) Z3A.39    Relevant Orders    POCT UA Automated manually resulted (Completed)     Continue prenatal vitamin.  Labs reviewed.  GBS Positive.    Expected mode of delivery Vaginal  Induction order for this wee entered.   Follow up  2 weeks pp.   Labor signs reviewed.     "

## 2025-02-06 ENCOUNTER — APPOINTMENT (OUTPATIENT)
Dept: OBSTETRICS AND GYNECOLOGY | Facility: HOSPITAL | Age: 30
End: 2025-02-06
Payer: COMMERCIAL

## 2025-02-06 ENCOUNTER — HOSPITAL ENCOUNTER (INPATIENT)
Facility: HOSPITAL | Age: 30
LOS: 3 days | Discharge: HOME | End: 2025-02-09
Attending: OBSTETRICS & GYNECOLOGY | Admitting: ADVANCED PRACTICE MIDWIFE
Payer: COMMERCIAL

## 2025-02-06 DIAGNOSIS — Z3A.39 39 WEEKS GESTATION OF PREGNANCY (HHS-HCC): ICD-10-CM

## 2025-02-06 PROBLEM — Z34.90 ENCOUNTER FOR INDUCTION OF LABOR: Status: ACTIVE | Noted: 2025-02-06

## 2025-02-06 LAB
ABO GROUP (TYPE) IN BLOOD: NORMAL
ANTIBODY SCREEN: NORMAL
ERYTHROCYTE [DISTWIDTH] IN BLOOD BY AUTOMATED COUNT: 15.9 % (ref 11.5–14.5)
HCT VFR BLD AUTO: 37.9 % (ref 36–46)
HGB BLD-MCNC: 12.6 G/DL (ref 12–16)
MCH RBC QN AUTO: 27.2 PG (ref 26–34)
MCHC RBC AUTO-ENTMCNC: 33.2 G/DL (ref 32–36)
MCV RBC AUTO: 82 FL (ref 80–100)
NRBC BLD-RTO: 0 /100 WBCS (ref 0–0)
PLATELET # BLD AUTO: 274 X10*3/UL (ref 150–450)
RBC # BLD AUTO: 4.64 X10*6/UL (ref 4–5.2)
RH FACTOR (ANTIGEN D): NORMAL
WBC # BLD AUTO: 9.5 X10*3/UL (ref 4.4–11.3)

## 2025-02-06 PROCEDURE — 85027 COMPLETE CBC AUTOMATED: CPT | Performed by: ADVANCED PRACTICE MIDWIFE

## 2025-02-06 PROCEDURE — 3E033VJ INTRODUCTION OF OTHER HORMONE INTO PERIPHERAL VEIN, PERCUTANEOUS APPROACH: ICD-10-PCS | Performed by: ADVANCED PRACTICE MIDWIFE

## 2025-02-06 PROCEDURE — 1120000001 HC OB PRIVATE ROOM DAILY

## 2025-02-06 PROCEDURE — 86780 TREPONEMA PALLIDUM: CPT | Mod: AHULAB | Performed by: ADVANCED PRACTICE MIDWIFE

## 2025-02-06 PROCEDURE — 36415 COLL VENOUS BLD VENIPUNCTURE: CPT | Performed by: ADVANCED PRACTICE MIDWIFE

## 2025-02-06 PROCEDURE — 99222 1ST HOSP IP/OBS MODERATE 55: CPT | Performed by: ADVANCED PRACTICE MIDWIFE

## 2025-02-06 PROCEDURE — 86901 BLOOD TYPING SEROLOGIC RH(D): CPT | Performed by: ADVANCED PRACTICE MIDWIFE

## 2025-02-06 PROCEDURE — 2500000004 HC RX 250 GENERAL PHARMACY W/ HCPCS (ALT 636 FOR OP/ED): Performed by: ADVANCED PRACTICE MIDWIFE

## 2025-02-06 RX ORDER — OXYTOCIN 10 [USP'U]/ML
10 INJECTION, SOLUTION INTRAMUSCULAR; INTRAVENOUS ONCE AS NEEDED
Status: DISCONTINUED | OUTPATIENT
Start: 2025-02-06 | End: 2025-02-07

## 2025-02-06 RX ORDER — LOPERAMIDE HYDROCHLORIDE 2 MG/1
4 CAPSULE ORAL EVERY 2 HOUR PRN
Status: DISCONTINUED | OUTPATIENT
Start: 2025-02-06 | End: 2025-02-07

## 2025-02-06 RX ORDER — HYDRALAZINE HYDROCHLORIDE 20 MG/ML
5 INJECTION INTRAMUSCULAR; INTRAVENOUS ONCE AS NEEDED
Status: DISCONTINUED | OUTPATIENT
Start: 2025-02-06 | End: 2025-02-07

## 2025-02-06 RX ORDER — CARBOPROST TROMETHAMINE 250 UG/ML
250 INJECTION, SOLUTION INTRAMUSCULAR ONCE AS NEEDED
Status: DISCONTINUED | OUTPATIENT
Start: 2025-02-06 | End: 2025-02-07

## 2025-02-06 RX ORDER — FENTANYL/ROPIVACAINE/NS/PF 2MCG/ML-.2
0-25 PLASTIC BAG, INJECTION (ML) INJECTION CONTINUOUS
Status: DISCONTINUED | OUTPATIENT
Start: 2025-02-06 | End: 2025-02-07

## 2025-02-06 RX ORDER — LIDOCAINE HYDROCHLORIDE 10 MG/ML
30 INJECTION, SOLUTION INFILTRATION; PERINEURAL ONCE AS NEEDED
Status: DISCONTINUED | OUTPATIENT
Start: 2025-02-06 | End: 2025-02-07

## 2025-02-06 RX ORDER — SODIUM CHLORIDE, SODIUM LACTATE, POTASSIUM CHLORIDE, CALCIUM CHLORIDE 600; 310; 30; 20 MG/100ML; MG/100ML; MG/100ML; MG/100ML
75 INJECTION, SOLUTION INTRAVENOUS CONTINUOUS
Status: DISCONTINUED | OUTPATIENT
Start: 2025-02-06 | End: 2025-02-07

## 2025-02-06 RX ORDER — OXYTOCIN/0.9 % SODIUM CHLORIDE 30/500 ML
60 PLASTIC BAG, INJECTION (ML) INTRAVENOUS ONCE AS NEEDED
Status: DISCONTINUED | OUTPATIENT
Start: 2025-02-06 | End: 2025-02-07

## 2025-02-06 RX ORDER — TERBUTALINE SULFATE 1 MG/ML
0.25 INJECTION SUBCUTANEOUS ONCE AS NEEDED
Status: DISCONTINUED | OUTPATIENT
Start: 2025-02-06 | End: 2025-02-07

## 2025-02-06 RX ORDER — MISOPROSTOL 200 UG/1
800 TABLET ORAL ONCE AS NEEDED
Status: COMPLETED | OUTPATIENT
Start: 2025-02-06 | End: 2025-02-07

## 2025-02-06 RX ORDER — LABETALOL HYDROCHLORIDE 5 MG/ML
20 INJECTION, SOLUTION INTRAVENOUS ONCE AS NEEDED
Status: DISCONTINUED | OUTPATIENT
Start: 2025-02-06 | End: 2025-02-07

## 2025-02-06 RX ORDER — PENICILLIN G 3000000 [IU]/50ML
3 INJECTION, SOLUTION INTRAVENOUS EVERY 4 HOURS
Status: DISCONTINUED | OUTPATIENT
Start: 2025-02-06 | End: 2025-02-07

## 2025-02-06 RX ORDER — TRANEXAMIC ACID 100 MG/ML
1000 INJECTION, SOLUTION INTRAVENOUS ONCE AS NEEDED
Status: DISCONTINUED | OUTPATIENT
Start: 2025-02-06 | End: 2025-02-07

## 2025-02-06 RX ORDER — ONDANSETRON 4 MG/1
4 TABLET, FILM COATED ORAL EVERY 6 HOURS PRN
Status: DISCONTINUED | OUTPATIENT
Start: 2025-02-06 | End: 2025-02-07

## 2025-02-06 RX ORDER — ONDANSETRON HYDROCHLORIDE 2 MG/ML
4 INJECTION, SOLUTION INTRAVENOUS EVERY 6 HOURS PRN
Status: DISCONTINUED | OUTPATIENT
Start: 2025-02-06 | End: 2025-02-07

## 2025-02-06 RX ORDER — OXYTOCIN/0.9 % SODIUM CHLORIDE 30/500 ML
2-30 PLASTIC BAG, INJECTION (ML) INTRAVENOUS CONTINUOUS
Status: DISCONTINUED | OUTPATIENT
Start: 2025-02-06 | End: 2025-02-07

## 2025-02-06 RX ORDER — METHYLERGONOVINE MALEATE 0.2 MG/ML
0.2 INJECTION INTRAVENOUS ONCE AS NEEDED
Status: COMPLETED | OUTPATIENT
Start: 2025-02-06 | End: 2025-02-07

## 2025-02-06 RX ORDER — NIFEDIPINE 10 MG/1
10 CAPSULE ORAL ONCE AS NEEDED
Status: DISCONTINUED | OUTPATIENT
Start: 2025-02-06 | End: 2025-02-07

## 2025-02-06 RX ADMIN — PENICILLIN G 3 MILLION UNITS: 3000000 INJECTION, SOLUTION INTRAVENOUS at 23:40

## 2025-02-06 RX ADMIN — PENICILLIN G POTASSIUM 5 MILLION UNITS: 5000000 INJECTION, POWDER, FOR SOLUTION INTRAMUSCULAR; INTRAVENOUS at 19:48

## 2025-02-06 RX ADMIN — Medication 2 MILLI-UNITS/MIN: at 19:42

## 2025-02-06 SDOH — SOCIAL STABILITY: SOCIAL INSECURITY: VERBAL ABUSE: DENIES

## 2025-02-06 SDOH — SOCIAL STABILITY: SOCIAL INSECURITY: WITHIN THE LAST YEAR, HAVE YOU BEEN HUMILIATED OR EMOTIONALLY ABUSED IN OTHER WAYS BY YOUR PARTNER OR EX-PARTNER?: NO

## 2025-02-06 SDOH — ECONOMIC STABILITY: FOOD INSECURITY: HOW HARD IS IT FOR YOU TO PAY FOR THE VERY BASICS LIKE FOOD, HOUSING, MEDICAL CARE, AND HEATING?: NOT HARD AT ALL

## 2025-02-06 SDOH — ECONOMIC STABILITY: FOOD INSECURITY: WITHIN THE PAST 12 MONTHS, YOU WORRIED THAT YOUR FOOD WOULD RUN OUT BEFORE YOU GOT THE MONEY TO BUY MORE.: NEVER TRUE

## 2025-02-06 SDOH — SOCIAL STABILITY: SOCIAL INSECURITY: WITHIN THE LAST YEAR, HAVE YOU BEEN AFRAID OF YOUR PARTNER OR EX-PARTNER?: NO

## 2025-02-06 SDOH — SOCIAL STABILITY: SOCIAL INSECURITY: HAVE YOU HAD THOUGHTS OF HARMING ANYONE ELSE?: NO

## 2025-02-06 SDOH — SOCIAL STABILITY: SOCIAL INSECURITY
WITHIN THE LAST YEAR, HAVE YOU BEEN KICKED, HIT, SLAPPED, OR OTHERWISE PHYSICALLY HURT BY YOUR PARTNER OR EX-PARTNER?: NO

## 2025-02-06 SDOH — SOCIAL STABILITY: SOCIAL INSECURITY: ARE THERE ANY APPARENT SIGNS OF INJURIES/BEHAVIORS THAT COULD BE RELATED TO ABUSE/NEGLECT?: NO

## 2025-02-06 SDOH — HEALTH STABILITY: PHYSICAL HEALTH: ON AVERAGE, HOW MANY DAYS PER WEEK DO YOU ENGAGE IN MODERATE TO STRENUOUS EXERCISE (LIKE A BRISK WALK)?: 0 DAYS

## 2025-02-06 SDOH — ECONOMIC STABILITY: FOOD INSECURITY: WITHIN THE PAST 12 MONTHS, THE FOOD YOU BOUGHT JUST DIDN'T LAST AND YOU DIDN'T HAVE MONEY TO GET MORE.: NEVER TRUE

## 2025-02-06 SDOH — SOCIAL STABILITY: SOCIAL INSECURITY: ARE YOU OR HAVE YOU BEEN THREATENED OR ABUSED PHYSICALLY, EMOTIONALLY, OR SEXUALLY BY ANYONE?: NO

## 2025-02-06 SDOH — SOCIAL STABILITY: SOCIAL INSECURITY
WITHIN THE LAST YEAR, HAVE YOU BEEN RAPED OR FORCED TO HAVE ANY KIND OF SEXUAL ACTIVITY BY YOUR PARTNER OR EX-PARTNER?: NO

## 2025-02-06 SDOH — HEALTH STABILITY: MENTAL HEALTH: WISH TO BE DEAD (PAST 1 MONTH): NO

## 2025-02-06 SDOH — SOCIAL STABILITY: SOCIAL INSECURITY: HAS ANYONE EVER THREATENED TO HURT YOUR FAMILY OR YOUR PETS?: NO

## 2025-02-06 SDOH — HEALTH STABILITY: MENTAL HEALTH: WERE YOU ABLE TO COMPLETE ALL THE BEHAVIORAL HEALTH SCREENINGS?: NO

## 2025-02-06 SDOH — ECONOMIC STABILITY: TRANSPORTATION INSECURITY: IN THE PAST 12 MONTHS, HAS LACK OF TRANSPORTATION KEPT YOU FROM MEDICAL APPOINTMENTS OR FROM GETTING MEDICATIONS?: NO

## 2025-02-06 SDOH — SOCIAL STABILITY: SOCIAL INSECURITY: HAVE YOU HAD ANY THOUGHTS OF HARMING ANYONE ELSE?: NO

## 2025-02-06 SDOH — HEALTH STABILITY: MENTAL HEALTH: SUICIDAL BEHAVIOR (LIFETIME): NO

## 2025-02-06 SDOH — SOCIAL STABILITY: SOCIAL INSECURITY: PHYSICAL ABUSE: DENIES

## 2025-02-06 SDOH — ECONOMIC STABILITY: HOUSING INSECURITY: DO YOU FEEL UNSAFE GOING BACK TO THE PLACE WHERE YOU ARE LIVING?: NO

## 2025-02-06 SDOH — SOCIAL STABILITY: SOCIAL INSECURITY: ABUSE SCREEN: ADULT

## 2025-02-06 SDOH — HEALTH STABILITY: PHYSICAL HEALTH: ON AVERAGE, HOW MANY MINUTES DO YOU ENGAGE IN EXERCISE AT THIS LEVEL?: 0 MIN

## 2025-02-06 SDOH — SOCIAL STABILITY: SOCIAL INSECURITY: DO YOU FEEL ANYONE HAS EXPLOITED OR TAKEN ADVANTAGE OF YOU FINANCIALLY OR OF YOUR PERSONAL PROPERTY?: NO

## 2025-02-06 SDOH — HEALTH STABILITY: MENTAL HEALTH: NON-SPECIFIC ACTIVE SUICIDAL THOUGHTS (PAST 1 MONTH): NO

## 2025-02-06 SDOH — SOCIAL STABILITY: SOCIAL INSECURITY: DOES ANYONE TRY TO KEEP YOU FROM HAVING/CONTACTING OTHER FRIENDS OR DOING THINGS OUTSIDE YOUR HOME?: NO

## 2025-02-06 ASSESSMENT — LIFESTYLE VARIABLES
AUDIT-C TOTAL SCORE: 0
HOW MANY STANDARD DRINKS CONTAINING ALCOHOL DO YOU HAVE ON A TYPICAL DAY: PATIENT DOES NOT DRINK
HOW OFTEN DO YOU HAVE A DRINK CONTAINING ALCOHOL: NEVER
SKIP TO QUESTIONS 9-10: 1
HOW OFTEN DO YOU HAVE 6 OR MORE DRINKS ON ONE OCCASION: NEVER
AUDIT-C TOTAL SCORE: 0

## 2025-02-06 ASSESSMENT — PATIENT HEALTH QUESTIONNAIRE - PHQ9
SUM OF ALL RESPONSES TO PHQ9 QUESTIONS 1 & 2: 0
2. FEELING DOWN, DEPRESSED OR HOPELESS: NOT AT ALL
1. LITTLE INTEREST OR PLEASURE IN DOING THINGS: NOT AT ALL

## 2025-02-06 ASSESSMENT — PAIN SCALES - GENERAL
PAINLEVEL_OUTOF10: 4

## 2025-02-06 ASSESSMENT — ACTIVITIES OF DAILY LIVING (ADL)
LACK_OF_TRANSPORTATION: NO
LACK_OF_TRANSPORTATION: NO

## 2025-02-06 NOTE — H&P
OB Admission H&P    Assessment/Plan    Tabby Castano is a 29 y.o.  at 39w4d, MILKA: 2025, by Ultrasound, who presents for Induction of Labor.    Plan  -Admit to L&D, consented  -T&S, CBC, and Syphilis  -Epidural at patient request  -Recheck as clinically indicated by maternal or fetal status  -Plan to initiate induction with crb and pit  Dr. Sethi aware of admission, status and plan    Fetal Status  -NST reactive, reassuring   -Presentation ceph based on ultrasound and vaginal exam  -EFW 8# 8oz by leopold's and extrapolation from U/S  -GBS pos    Postpartum  Contraception Plan: undecided    Pregnancy Problems (from 24 to present)       Problem Noted Diagnosed Resolved    Encounter for induction of labor 2025 by JESSICA Galindo-LIANNE  No    Priority:  Medium       Excessive fetal growth affecting management of pregnancy in third trimester ([facility]) 2025 by Stepan Mccall MD  No    Priority:  Medium       39 weeks gestation of pregnancy ([facility]) 10/21/2024 by Stepan Mccall MD  No    Priority:  Medium       Overview Addendum 2024  9:57 AM by Stepan Mccall MD     Desired provider in labor: [] CNM  [] Physician  [x] Blood Products: [x] Yes, accepts [] No, needs counseling  [x] Initial BMI: Could not be calculated   [x] Prenatal Labs:   [x] Cervical Cancer Screening up to date  [x] Rh status:   [x] Genetic Screening:    [x] NT US: (11-13 wks)  [] Baby ASA (if indicated):  [x] Pregnancy dated by:     [] Anatomy US: (19-20 wks)  [] Federal Sterilization consent signed (if indicated):  [] 1hr GCT at 24-28wks:  [x] Rhogam (if indicated):   [] Fetal Surveillance (if indicated):  [x] Tdap (27-32 wks, may be given up to 36 wks if initial window missed):   [] RSV (32-36 wks) (Sept. to end of ):   [x] Flu Vaccine:  Patient declines    [] Breastfeeding:  [] Postpartum Birth control method:   [] GBS at 36 - 37 wks:  [] 39 weeks discussion of IOL vs. Expectant management:  [] Mode of  delivery ( anticipated ):            Rh negative state in antepartum period ([facility]) 2024 by Stepan Mccall MD  No    Priority:  Medium               Subjective   30yo  at 39.4 presents for IOL. Pregnancy notable for:  Bmi 41  Efw 7# 3 weeks ago (extrapolates to 8.5)  Gbs pos    Prenatal Provider david    OB History    Para Term  AB Living   1 0 0 0 0 0   SAB IAB Ectopic Multiple Live Births   0 0 0 0 0      # Outcome Date GA Lbr Dante/2nd Weight Sex Type Anes PTL Lv   1 Current                Past Surgical History:   Procedure Laterality Date    NASAL SEPTUM SURGERY      TOE SURGERY Left     TONSILECTOMY, ADENOIDECTOMY, BILATERAL MYRINGOTOMY AND TUBES      TYMPANOSTOMY TUBE PLACEMENT Bilateral        Social History     Tobacco Use    Smoking status: Never     Passive exposure: Never    Smokeless tobacco: Never   Substance Use Topics    Alcohol use: Not Currently       No Known Allergies    Medications Prior to Admission   Medication Sig Dispense Refill Last Dose/Taking    levocetirizine (Xyzal) 5 mg tablet Take 1 tablet (5 mg) by mouth once daily.   2025    Prenatal Vitamin Plus Low Iron 27 mg iron- 1 mg tablet Take 1 tablet by mouth once daily. 30 tablet 11 2025     Objective     Last Vitals  Temp Pulse Resp BP MAP O2 Sat                   Blood Pressures    No data found in the last 1 encounters.          Physical Exam  General: NAD, mood appropriate  Cardiopulmonary: warm and well perfused, breathing comfortably on room air  Abdomen: Gravid, non-tender  Extremities: Symmetric  Speculum Exam: not indicated  Cervix: 2 /60 /-3      Fetal Monitoring  Baseline: 150 bpm, Variability: moderate,  Accelerations: present and rare short & shallow variable decels  Uterine Activity: Irregular contractions  Interpretation: Category 2    Bedside ultrasound: Yes    Prenatal labs reviewed, not remarkable.

## 2025-02-07 ENCOUNTER — ANESTHESIA EVENT (OUTPATIENT)
Dept: OBSTETRICS AND GYNECOLOGY | Facility: HOSPITAL | Age: 30
End: 2025-02-07
Payer: COMMERCIAL

## 2025-02-07 ENCOUNTER — ANESTHESIA (OUTPATIENT)
Dept: OBSTETRICS AND GYNECOLOGY | Facility: HOSPITAL | Age: 30
End: 2025-02-07
Payer: COMMERCIAL

## 2025-02-07 LAB
FETAL MATERNAL SCREEN: NORMAL
TREPONEMA PALLIDUM IGG+IGM AB [PRESENCE] IN SERUM OR PLASMA BY IMMUNOASSAY: NONREACTIVE

## 2025-02-07 PROCEDURE — 2500000004 HC RX 250 GENERAL PHARMACY W/ HCPCS (ALT 636 FOR OP/ED): Performed by: NURSE ANESTHETIST, CERTIFIED REGISTERED

## 2025-02-07 PROCEDURE — 59050 FETAL MONITOR W/REPORT: CPT

## 2025-02-07 PROCEDURE — 59409 OBSTETRICAL CARE: CPT | Performed by: ADVANCED PRACTICE MIDWIFE

## 2025-02-07 PROCEDURE — 85461 HEMOGLOBIN FETAL: CPT

## 2025-02-07 PROCEDURE — 59400 OBSTETRICAL CARE: CPT | Performed by: ADVANCED PRACTICE MIDWIFE

## 2025-02-07 PROCEDURE — 7210000002 HC LABOR PER HOUR

## 2025-02-07 PROCEDURE — 2500000005 HC RX 250 GENERAL PHARMACY W/O HCPCS: Performed by: NURSE ANESTHETIST, CERTIFIED REGISTERED

## 2025-02-07 PROCEDURE — 51701 INSERT BLADDER CATHETER: CPT

## 2025-02-07 PROCEDURE — 3700000014 EPIDURAL BLOCK: Performed by: NURSE ANESTHETIST, CERTIFIED REGISTERED

## 2025-02-07 PROCEDURE — 10907ZC DRAINAGE OF AMNIOTIC FLUID, THERAPEUTIC FROM PRODUCTS OF CONCEPTION, VIA NATURAL OR ARTIFICIAL OPENING: ICD-10-PCS | Performed by: ADVANCED PRACTICE MIDWIFE

## 2025-02-07 PROCEDURE — 0HQ9XZZ REPAIR PERINEUM SKIN, EXTERNAL APPROACH: ICD-10-PCS | Performed by: ADVANCED PRACTICE MIDWIFE

## 2025-02-07 PROCEDURE — 2500000004 HC RX 250 GENERAL PHARMACY W/ HCPCS (ALT 636 FOR OP/ED): Performed by: ADVANCED PRACTICE MIDWIFE

## 2025-02-07 PROCEDURE — 2500000002 HC RX 250 W HCPCS SELF ADMINISTERED DRUGS (ALT 637 FOR MEDICARE OP, ALT 636 FOR OP/ED): Performed by: ADVANCED PRACTICE MIDWIFE

## 2025-02-07 PROCEDURE — 2500000001 HC RX 250 WO HCPCS SELF ADMINISTERED DRUGS (ALT 637 FOR MEDICARE OP): Performed by: ADVANCED PRACTICE MIDWIFE

## 2025-02-07 PROCEDURE — 36415 COLL VENOUS BLD VENIPUNCTURE: CPT | Performed by: ADVANCED PRACTICE MIDWIFE

## 2025-02-07 PROCEDURE — 2500000005 HC RX 250 GENERAL PHARMACY W/O HCPCS: Performed by: ADVANCED PRACTICE MIDWIFE

## 2025-02-07 PROCEDURE — 1100000001 HC PRIVATE ROOM DAILY

## 2025-02-07 PROCEDURE — 7100000016 HC LABOR RECOVERY PER HOUR

## 2025-02-07 RX ORDER — MORPHINE SULFATE 4 MG/ML
4 INJECTION, SOLUTION INTRAMUSCULAR; INTRAVENOUS ONCE
Status: COMPLETED | OUTPATIENT
Start: 2025-02-07 | End: 2025-02-07

## 2025-02-07 RX ORDER — POLYETHYLENE GLYCOL 3350 17 G/17G
17 POWDER, FOR SOLUTION ORAL 2 TIMES DAILY PRN
Status: DISCONTINUED | OUTPATIENT
Start: 2025-02-07 | End: 2025-02-09 | Stop reason: HOSPADM

## 2025-02-07 RX ORDER — LABETALOL HYDROCHLORIDE 5 MG/ML
20 INJECTION, SOLUTION INTRAVENOUS ONCE AS NEEDED
Status: DISCONTINUED | OUTPATIENT
Start: 2025-02-07 | End: 2025-02-09 | Stop reason: HOSPADM

## 2025-02-07 RX ORDER — ENOXAPARIN SODIUM 100 MG/ML
60 INJECTION SUBCUTANEOUS EVERY 24 HOURS
Status: DISCONTINUED | OUTPATIENT
Start: 2025-02-08 | End: 2025-02-09 | Stop reason: HOSPADM

## 2025-02-07 RX ORDER — DIPHENHYDRAMINE HYDROCHLORIDE 50 MG/ML
25 INJECTION INTRAMUSCULAR; INTRAVENOUS EVERY 6 HOURS PRN
Status: DISCONTINUED | OUTPATIENT
Start: 2025-02-07 | End: 2025-02-09 | Stop reason: HOSPADM

## 2025-02-07 RX ORDER — OXYTOCIN 10 [USP'U]/ML
10 INJECTION, SOLUTION INTRAMUSCULAR; INTRAVENOUS ONCE AS NEEDED
Status: DISCONTINUED | OUTPATIENT
Start: 2025-02-07 | End: 2025-02-09 | Stop reason: HOSPADM

## 2025-02-07 RX ORDER — NIFEDIPINE 10 MG/1
10 CAPSULE ORAL ONCE AS NEEDED
Status: DISCONTINUED | OUTPATIENT
Start: 2025-02-07 | End: 2025-02-09 | Stop reason: HOSPADM

## 2025-02-07 RX ORDER — HYDRALAZINE HYDROCHLORIDE 20 MG/ML
5 INJECTION INTRAMUSCULAR; INTRAVENOUS ONCE AS NEEDED
Status: DISCONTINUED | OUTPATIENT
Start: 2025-02-07 | End: 2025-02-09 | Stop reason: HOSPADM

## 2025-02-07 RX ORDER — IBUPROFEN 600 MG/1
600 TABLET ORAL EVERY 6 HOURS
Status: DISCONTINUED | OUTPATIENT
Start: 2025-02-07 | End: 2025-02-09 | Stop reason: HOSPADM

## 2025-02-07 RX ORDER — DIPHENHYDRAMINE HCL 25 MG
25 CAPSULE ORAL EVERY 6 HOURS PRN
Status: DISCONTINUED | OUTPATIENT
Start: 2025-02-07 | End: 2025-02-09 | Stop reason: HOSPADM

## 2025-02-07 RX ORDER — METHYLERGONOVINE MALEATE 0.2 MG/ML
0.2 INJECTION INTRAVENOUS ONCE AS NEEDED
Status: DISCONTINUED | OUTPATIENT
Start: 2025-02-07 | End: 2025-02-09 | Stop reason: HOSPADM

## 2025-02-07 RX ORDER — LIDOCAINE HCL/EPINEPHRINE/PF 2%-1:200K
VIAL (ML) INJECTION AS NEEDED
Status: DISCONTINUED | OUTPATIENT
Start: 2025-02-07 | End: 2025-02-07

## 2025-02-07 RX ORDER — OXYTOCIN/0.9 % SODIUM CHLORIDE 30/500 ML
60 PLASTIC BAG, INJECTION (ML) INTRAVENOUS ONCE AS NEEDED
Status: DISCONTINUED | OUTPATIENT
Start: 2025-02-07 | End: 2025-02-09 | Stop reason: HOSPADM

## 2025-02-07 RX ORDER — MISOPROSTOL 200 UG/1
800 TABLET ORAL ONCE AS NEEDED
Status: DISCONTINUED | OUTPATIENT
Start: 2025-02-07 | End: 2025-02-09 | Stop reason: HOSPADM

## 2025-02-07 RX ORDER — TRANEXAMIC ACID 100 MG/ML
1000 INJECTION, SOLUTION INTRAVENOUS ONCE AS NEEDED
Status: DISCONTINUED | OUTPATIENT
Start: 2025-02-07 | End: 2025-02-09 | Stop reason: HOSPADM

## 2025-02-07 RX ORDER — NORETHINDRONE AND ETHINYL ESTRADIOL 0.5-0.035
KIT ORAL AS NEEDED
Status: DISCONTINUED | OUTPATIENT
Start: 2025-02-07 | End: 2025-02-07

## 2025-02-07 RX ORDER — ADHESIVE BANDAGE
10 BANDAGE TOPICAL
Status: DISCONTINUED | OUTPATIENT
Start: 2025-02-07 | End: 2025-02-09 | Stop reason: HOSPADM

## 2025-02-07 RX ORDER — SIMETHICONE 80 MG
80 TABLET,CHEWABLE ORAL 4 TIMES DAILY PRN
Status: DISCONTINUED | OUTPATIENT
Start: 2025-02-07 | End: 2025-02-09 | Stop reason: HOSPADM

## 2025-02-07 RX ORDER — ACETAMINOPHEN 325 MG/1
975 TABLET ORAL EVERY 6 HOURS
Status: DISCONTINUED | OUTPATIENT
Start: 2025-02-07 | End: 2025-02-09 | Stop reason: HOSPADM

## 2025-02-07 RX ORDER — ONDANSETRON HYDROCHLORIDE 2 MG/ML
4 INJECTION, SOLUTION INTRAVENOUS EVERY 6 HOURS PRN
Status: DISCONTINUED | OUTPATIENT
Start: 2025-02-07 | End: 2025-02-09 | Stop reason: HOSPADM

## 2025-02-07 RX ORDER — LOPERAMIDE HYDROCHLORIDE 2 MG/1
4 CAPSULE ORAL EVERY 2 HOUR PRN
Status: DISCONTINUED | OUTPATIENT
Start: 2025-02-07 | End: 2025-02-09 | Stop reason: HOSPADM

## 2025-02-07 RX ORDER — ONDANSETRON 4 MG/1
4 TABLET, FILM COATED ORAL EVERY 6 HOURS PRN
Status: DISCONTINUED | OUTPATIENT
Start: 2025-02-07 | End: 2025-02-09 | Stop reason: HOSPADM

## 2025-02-07 RX ORDER — CARBOPROST TROMETHAMINE 250 UG/ML
250 INJECTION, SOLUTION INTRAMUSCULAR ONCE AS NEEDED
Status: DISCONTINUED | OUTPATIENT
Start: 2025-02-07 | End: 2025-02-09 | Stop reason: HOSPADM

## 2025-02-07 RX ADMIN — IBUPROFEN 600 MG: 600 TABLET, FILM COATED ORAL at 11:02

## 2025-02-07 RX ADMIN — ACETAMINOPHEN 975 MG: 325 TABLET ORAL at 11:02

## 2025-02-07 RX ADMIN — ONDANSETRON 4 MG: 2 INJECTION, SOLUTION INTRAMUSCULAR; INTRAVENOUS at 07:51

## 2025-02-07 RX ADMIN — PENICILLIN G 3 MILLION UNITS: 3000000 INJECTION, SOLUTION INTRAVENOUS at 03:49

## 2025-02-07 RX ADMIN — METHYLERGONOVINE MALEATE 0.2 MG: 0.2 INJECTION, SOLUTION INTRAMUSCULAR; INTRAVENOUS at 10:15

## 2025-02-07 RX ADMIN — Medication 8 ML/HR: at 03:15

## 2025-02-07 RX ADMIN — IBUPROFEN 600 MG: 600 TABLET, FILM COATED ORAL at 17:04

## 2025-02-07 RX ADMIN — ACETAMINOPHEN 975 MG: 325 TABLET ORAL at 23:26

## 2025-02-07 RX ADMIN — TRANEXAMIC ACID 1000 MG: 1 INJECTION, SOLUTION INTRAVENOUS at 10:20

## 2025-02-07 RX ADMIN — LIDOCAINE HYDROCHLORIDE,EPINEPHRINE BITARTRATE 3 ML: 20; .005 INJECTION, SOLUTION EPIDURAL; INFILTRATION; INTRACAUDAL; PERINEURAL at 03:10

## 2025-02-07 RX ADMIN — MORPHINE SULFATE 4 MG: 4 INJECTION, SOLUTION INTRAMUSCULAR; INTRAVENOUS at 01:56

## 2025-02-07 RX ADMIN — PENICILLIN G 3 MILLION UNITS: 3000000 INJECTION, SOLUTION INTRAVENOUS at 08:20

## 2025-02-07 RX ADMIN — EPHEDRINE SULFATE 10 MG: 50 INJECTION, SOLUTION INTRAVENOUS at 03:40

## 2025-02-07 RX ADMIN — SODIUM CHLORIDE, POTASSIUM CHLORIDE, SODIUM LACTATE AND CALCIUM CHLORIDE 500 ML: 600; 310; 30; 20 INJECTION, SOLUTION INTRAVENOUS at 07:47

## 2025-02-07 RX ADMIN — ACETAMINOPHEN 975 MG: 325 TABLET ORAL at 17:04

## 2025-02-07 RX ADMIN — SODIUM CHLORIDE, POTASSIUM CHLORIDE, SODIUM LACTATE AND CALCIUM CHLORIDE 500 ML: 600; 310; 30; 20 INJECTION, SOLUTION INTRAVENOUS at 02:45

## 2025-02-07 RX ADMIN — ONDANSETRON 4 MG: 2 INJECTION, SOLUTION INTRAMUSCULAR; INTRAVENOUS at 03:43

## 2025-02-07 RX ADMIN — Medication 6 ML: at 03:12

## 2025-02-07 RX ADMIN — IBUPROFEN 600 MG: 600 TABLET, FILM COATED ORAL at 23:25

## 2025-02-07 RX ADMIN — MISOPROSTOL 800 MCG: 200 TABLET ORAL at 10:12

## 2025-02-07 SDOH — HEALTH STABILITY: MENTAL HEALTH: CURRENT SMOKER: 0

## 2025-02-07 ASSESSMENT — PAIN SCALES - GENERAL
PAINLEVEL_OUTOF10: 0 - NO PAIN
PAINLEVEL_OUTOF10: 3
PAINLEVEL_OUTOF10: 0 - NO PAIN
PAINLEVEL_OUTOF10: 0 - NO PAIN
PAINLEVEL_OUTOF10: 10 - WORST POSSIBLE PAIN
PAINLEVEL_OUTOF10: 2
PAINLEVEL_OUTOF10: 0 - NO PAIN
PAINLEVEL_OUTOF10: 4
PAINLEVEL_OUTOF10: 0 - NO PAIN
PAINLEVEL_OUTOF10: 0 - NO PAIN
PAINLEVEL_OUTOF10: 10 - WORST POSSIBLE PAIN
PAINLEVEL_OUTOF10: 2
PAINLEVEL_OUTOF10: 6
PAINLEVEL_OUTOF10: 5 - MODERATE PAIN
PAINLEVEL_OUTOF10: 2
PAINLEVEL_OUTOF10: 3
PAINLEVEL_OUTOF10: 0 - NO PAIN

## 2025-02-07 ASSESSMENT — PAIN SCALES - WONG BAKER: WONGBAKER_NUMERICALRESPONSE: HURTS LITTLE BIT

## 2025-02-07 ASSESSMENT — PAIN DESCRIPTION - LOCATION: LOCATION: VAGINA

## 2025-02-07 NOTE — PROGRESS NOTES
[facility] LABOR PROGRESS NOTE    Subjective:    Patient is doing well with contractions.   Partner and her mother are supportive and present at the bedside.    Contraction pain is mild, she describes it as crampy.  Epidural:  she is planning on going as long as she can NCB    Objective:   Visit Vitals  /77   Pulse 96   Temp 37.1 °C (98.8 °F) (Oral)   Resp 18       FHR:      Baseline 145  Variability; moderate  Accels; Reactive  Decels none                                      Contraction Frequency:  irregular, every 6-7 minutes    Cervical Exam: deferred CFB remains in place    Membranes:  are intact    Pitocin:  Yes  2milliunits/minute    Assessment:  Tabby Castano is a 29 y.o.  at 39w4d  Elective IOL CFB in place and pit  Suspect LGA - 37 week growth AC and EFW in 80+% overall EFW extrapolated to be over 8.5 lbs - will plan to have a step up at the bed side for delivery  Rh neg -  received rhogam at 28 weeks. Plan for pp rhogam PRN  Cervical ripening portion of Labor  Fetal Heart Rate Category 1 - overall reassuring  GBS: pos    Plan:  Activity as tolerated  Clear liquids  Encouraged to rest  Frequent position changes  Continuous fetal monitoring  Continue pitocin and titrate as needed to achieve adequate contraction pattern  Penicillin for GBS prophylaxis  Re-evaluate in 2-4 hours or as needed    Reviewed with Dr Sethi who agreed with plan of care    Electronically signed by KHANG Peoples on 2025 at 7:49 PM

## 2025-02-07 NOTE — ANESTHESIA PREPROCEDURE EVALUATION
Patient: Tabby Castano    Evaluation Method: In-person visit    Procedure Information    Date: 02/07/25  Procedure: Labor Analgesia         Relevant Problems   Endocrine   (+) Obesity complicating childbirth ([facility])      GYN   (+) 39 weeks gestation of pregnancy ([facility])       Clinical information reviewed:   Tobacco  Allergies  Meds  Problems  Med Hx  Surg Hx   Fam Hx  Soc   Hx        NPO Detail:  NPO/Void Status  Date of Last Liquid: 02/06/25  Time of Last Liquid: 0557  Date of Last Solid: 02/06/25  Time of Last Solid: 1300         OB/GYN     Physical Exam    Airway  Mallampati: II  TM distance: >3 FB     Cardiovascular - normal exam     Dental - normal exam     Pulmonary - normal exam     Abdominal   (+) obese             Anesthesia Plan    History of general anesthesia?: yes  History of complications of general anesthesia?: no    ASA 3     epidural     The patient is not a current smoker.  Patient was previously instructed to abstain from smoking on day of procedure.  Patient did not smoke on day of procedure.    Anesthetic plan and risks discussed with patient.

## 2025-02-07 NOTE — SIGNIFICANT EVENT
Pt is complete and +1    Feeling nauseated, likely from hypotension. B/p 91/50  Will bolus 500mL LR and give zofran.   Start active pushing when pt feeling better.  Fh remains cat 1

## 2025-02-07 NOTE — CARE PLAN
Problem: Postpartum  Goal: Experiences normal postpartum course  Outcome: Progressing  Goal: Appropriate maternal -  bonding  Outcome: Progressing  Goal: Establish and maintain infant feeding pattern for adequate nutrition  Outcome: Progressing  Goal: Incisions, wounds, or drain sites healing without S/S of infection  Outcome: Progressing  Goal: No s/sx infection  Outcome: Progressing  Goal: No s/sx of hemorrhage  Outcome: Progressing  Goal: Minimal s/sx of HDP and BP<160/110  Outcome: Progressing     Problem: Pain - Adult  Goal: Verbalizes/displays adequate comfort level or baseline comfort level  Outcome: Progressing     Problem: Safety - Adult  Goal: Free from fall injury  Outcome: Progressing         The patient's goals for the shift include bonding with baby/ skin to skin    The clinical goals for the shift include safety/ injury free

## 2025-02-07 NOTE — ANESTHESIA PROCEDURE NOTES
Epidural Block    Patient location during procedure: OB  Start time: 2/7/2025 3:00 AM  End time: 2/7/2025 3:20 AM  Reason for block: labor analgesia  Staffing  Performed: CRNA   Authorized by: CHAS Chavira    Performed by: CHAS Chavira    Preanesthetic Checklist  Completed: patient identified, IV checked, risks and benefits discussed, surgical consent, pre-op evaluation, timeout performed and sterile techniques followed  Block Timeout  RN/Licensed healthcare professional reads aloud to the Anesthesia provider and entire team: Patient identity, procedure with side and site, patient position, and as applicable the availability of implants/special equipment/special requirements.  Patient on coagulant treatment: yes  Timeout performed at: 2/7/2025 3:00 AM  Block Placement  Patient position: sitting  Prep: Betadine  Sterility prep: gloves, mask and drape  Sedation level: no sedation  Patient monitoring: continuous pulse oximetry, blood pressure and heart rate  Approach: midline  Local numbing: lidocaine 1% to skin and subcutaneous tissues  Vertebral space: lumbar  Lumbar location: L3-L4  Epidural  Loss of resistance technique: air  Guidance: landmark technique        Needle  Needle type: Tuohy   Needle gauge: 17  Catheter type: multi-orifice  Catheter at skin depth: 14 cm  Catheter securement method: clear occlusive dressing and surgical tape    Test dose: lidocaine 1.5% with epinephrine 1-to-200,000  Test dose: lidocaine 1.5% with epinephrine 1-to-200,000  Test dose result: no positive test dose    PCEA  Medication concentration used: 0.2% Ropivacaine with 2 mcg/mL Fentanyl  Dose (mL): 4  Lockout (minutes): 20  1-Hour Limit (boluses/hr): 3  Basal Rate: 8        Assessment  Sensory level: T6  Block outcome: pain improved  Number of attempts: 1  Events: no positive test dose

## 2025-02-07 NOTE — L&D DELIVERY NOTE
OB Delivery Note  2025  Tabby Castano  29 y.o.   Vaginal, Spontaneous     Pt pushed for about 80 minutes with good progress. Head delivered somewhat slowly and head of bed laid flat, stool at bedside. On first attempt at downward traction shoulder delivered without incident, followed easily by the rest of body. Baby vigorous and to maternal abdomen for dry/stim. After cessation of pulsation, cord clamped x2 and cut. Cord blood collected. Placenta delivered with gentle cord traction and maternal pushing efforts. Trailing membranes teased out with ring forceps and apparently complete and intact. Fundus firm. Small first deg lac and small sidewall lac both bleeding and both repaired with one figure-of-eight. Trickle of blood noted. Bladder emptied. Fundal massage. Trickle stopped then returned. Meds called for. Uterine sweep done & some clots removed. 800mcg cyto given FL, pit bag running, and 200mcg methergine given IM. Trickle continued to return and caroline. TXA given. After a few more minutes of fundal massage bleeding seemed to have stopped. Ebl at this time 300 and moving forward will weight pads. Mother and baby bonding well and stable to recovery.     Gestational Age: 39w5d  /Para:   Quantitative Blood Loss: Admission to Discharge: 351 mL (2025  5:58 PM - 2025  2:51 PM)    Tabby CastanoEarlenetashihermelindo [[id number]]      Labor Events    Sac identifier: Sac 1  Rupture date/time: 2025 0052  Rupture type: Artificial  Fluid color: Clear  Fluid odor: None  Labor type: Induced Onset of Labor  Labor allowed to proceed with plans for an attempted vaginal birth?: Yes  Induction: [place]/EASI, Oxytocin, AROM  Induction date/time: 2025  Induction indications: Risk Reducing  Complications: None       Labor Event Times    Labor onset date/time: 2025  Dilation complete date/time: 2025  Start pushing date/time: 2025       Labor Length    2nd stage: 1h 21m  3rd stage:  0h 05m       Placenta    Placenta delivery date/time: 2025 0954  Placenta removal: Spontaneous  Placenta appearance: Intact  Placenta disposition: discarded       Cord    Vessels: 3 vessels  Complications: None  Delayed cord clamping?: Yes  Cord clamped date/time: 2025 09:50:00  Cord blood disposition: Lab  Gases sent?: No  Stem cell collection (by provider): No       Lacerations    Perineal laceration: 1st  Perineal laceration repaired?: Yes       Anesthesia    Method: Epidural       Operative Delivery    Forceps attempted?: No  Vacuum extractor attempted?: No       Shoulder Dystocia    Shoulder dystocia present?: No        Delivery    Time head delivered: 2025 09:48:00  Birth date/time: 2025 09:49:00  Delivery type: Vaginal, Spontaneous  Complications: None       Resuscitation    Method: Tactile stimulation       Apgars    Living status: Living  Apgar Component Scores:  1 min.:  5 min.:  10 min.:  15 min.:  20 min.:    Skin color:  0  1       Heart rate:  2  2       Reflex irritability:  2  2       Muscle tone:  2  2       Respiratory effort:  2  2       Total:  8  9       Apgars assigned by: ORION CONNER       Delivery Providers    Delivering clinician: KHANG Galindo   Provider Role    Marleen Perez RN Delivery Nurse    Gabriela Conner RN Nursery Nurse     Resident    Evelia Vasquez RN Delivery Nurse                     KHANG Galindo

## 2025-02-07 NOTE — PROGRESS NOTES
[facility] LABOR PROGRESS NOTE    Subjective:    Patient is doing well with contractions.   Partner is supportive and present at the bedside.    Contraction pain is mild, much relieved as CFB is out at this time.    Epidural:  not at this time.     Objective:   Visit Vitals  /69   Pulse 86   Temp 36.7 °C (98.1 °F) (Temporal)   Resp 18       FHR:      Baseline 135  Variability; moderate  Accels; Reactive  Decels none                                      Contraction Frequency:  irregular, every 2-4 minutes    Cervical Exam: 5 cm, 80%, -3 station BBOW    Membranes:  are now AROM for clear fluid    Pitocin:  Yes  6milliunits/minute    Assessment:  Tabby Castano is a 29 y.o.  at 39w5d  Elective IOL s/p CFB   Suspect LGA - 37 week growth AC and EFW in 80+% overall EFW extrapolated to be over 8.5 lbs - will plan to have a step up at the bed side for delivery  Rh neg -  received rhogam at 28 weeks. Plan for pp rhogam PRN  Latent labor  Fetal Heart Rate Category 1 - overall reassuring  GBS: pos - adequately treated at this time    Plan:  AROM with exam for clear fluid patient tolerated well  Plan to continue to titrate pitpcon per unit protocol until adequate contraction pattern achieved  Activity as tolerated  Clear liquids  Encouraged to rest  Frequent position changes  Continuous fetal monitoring  Continue pitocin and titrate as needed to achieve adequate contraction pattern  Penicillin for GBS prophylaxis  Re-evaluate in 2 hours or as needed    Anticipate continued cervical change and onset of active labor and SVB  Patient and family encouraged to rest    Electronically signed by HKANG Peoples on 2025 at 12:55 AM

## 2025-02-07 NOTE — PROGRESS NOTES
CNM LABOR PROGRESS NOTE    Subjective:    Patient is doing well with contractions.   Partner and her motherare supportive and present at the bedside.    Contraction pain is none, s/p epidural at this time   Epidural:  infusing and effective    Objective:   Visit Vitals  BP 92/55   Pulse 98   Temp 36.6 °C (97.9 °F) (Temporal)   Resp 18       FHR:      Baseline 145  Variability; moderate with periods of minimal, pos scalp stim elicited with exam  Accels; Reactive  Decels  occasional variable and repetitive lates , resolved with position changes. Appears to be a prolonged variable, but when switching from baldomero monitoring, it was tracing maternal.                                       Contraction Frequency:   toco needs to be adjusted, however, irregular prior to switching to wired    Cervical Exam: 7 cm, 90%, -1 station    Membranes:  are ruptured, fluid remains clear- forbag noted on exam, and ruptured with exam    Pitocin:  Yes  10milliunits/minute    Assessment:  Tabby Castano is a 29 y.o.  at 39w5d  Elective IOL s/p CFB, AROM and pit  Suspect LGA - 37 week growth AC and EFW in 80+% overall EFW extrapolated to be over 8.5 lbs - will plan to have a step up at the bed side for delivery  Rh neg -  received rhogam at 28 weeks. Plan for pp rhogam PRN  transitional labor  Fetal Heart Rate Category 2 - overall reassuring  GBS: pos - adequately treated    Plan:  Forbag ruptured with exam  Activity as tolerated  Clear liquids  Encouraged to rest  Frequent position changes  Positioned to far left sidelying position with leg elevated on peanut ball  Continuous fetal monitoring  Continue pitocin and titrate as needed to achieve adequate contraction pattern  Continue epidural infusion as managed by anesthesia dept  Penicillin for GBS prophylaxis  Re-evaluate in 2-4 hours or as needed  Anticipate continued cervical change and onset of second stage and SVB        Electronically signed by Giovanna Rollins,  JESSICA-LIANNE on 2/7/2025 at 5:22 AM

## 2025-02-07 NOTE — PROGRESS NOTES
CFB remains in place  Cat I tracing  Cx q 2-3 minutes  Pain mild. Plan for AROM and continued titration of pitocin once CFB is out.    Spironolactone Counseling: Patient advised regarding risks of diarrhea, abdominal pain, hyperkalemia, birth defects (for female patients), liver toxicity and renal toxicity. The patient may need blood work to monitor liver and kidney function and potassium levels while on therapy. The patient verbalized understanding of the proper use and possible adverse effects of spironolactone.  All of the patient's questions and concerns were addressed. Azelaic Acid Counseling: Patient counseled that medicine may cause skin irritation and to avoid applying near the eyes.  In the event of skin irritation, the patient was advised to reduce the amount of the drug applied or use it less frequently.   The patient verbalized understanding of the proper use and possible adverse effects of azelaic acid.  All of the patient's questions and concerns were addressed. Benzoyl Peroxide Counseling: Patient counseled that medicine may cause skin irritation and bleach clothing.  In the event of skin irritation, the patient was advised to reduce the amount of the drug applied or use it less frequently.   The patient verbalized understanding of the proper use and possible adverse effects of benzoyl peroxide.  All of the patient's questions and concerns were addressed. Include Pregnancy/Lactation Warning?: No Topical Clindamycin Counseling: Patient counseled that this medication may cause skin irritation or allergic reactions.  In the event of skin irritation, the patient was advised to reduce the amount of the drug applied or use it less frequently.   The patient verbalized understanding of the proper use and possible adverse effects of clindamycin.  All of the patient's questions and concerns were addressed. Aklief Pregnancy And Lactation Text: It is unknown if this medication is safe to use during pregnancy.  It is unknown if this medication is excreted in breast milk.  Breastfeeding women should use the topical cream on the smallest area of the skin for the shortest time needed while breastfeeding.  Do not apply to nipple and areola. Erythromycin Counseling:  I discussed with the patient the risks of erythromycin including but not limited to GI upset, allergic reaction, drug rash, diarrhea, increase in liver enzymes, and yeast infections. Dapsone Counseling: I discussed with the patient the risks of dapsone including but not limited to hemolytic anemia, agranulocytosis, rashes, methemoglobinemia, kidney failure, peripheral neuropathy, headaches, GI upset, and liver toxicity.  Patients who start dapsone require monitoring including baseline LFTs and weekly CBCs for the first month, then every month thereafter.  The patient verbalized understanding of the proper use and possible adverse effects of dapsone.  All of the patient's questions and concerns were addressed. Tazorac Pregnancy And Lactation Text: This medication is not safe during pregnancy. It is unknown if this medication is excreted in breast milk. Minocycline Counseling: Patient advised regarding possible photosensitivity and discoloration of the teeth, skin, lips, tongue and gums.  Patient instructed to avoid sunlight, if possible.  When exposed to sunlight, patients should wear protective clothing, sunglasses, and sunscreen.  The patient was instructed to call the office immediately if the following severe adverse effects occur:  hearing changes, easy bruising/bleeding, severe headache, or vision changes.  The patient verbalized understanding of the proper use and possible adverse effects of minocycline.  All of the patient's questions and concerns were addressed. Tazorac Counseling:  Patient advised that medication is irritating and drying.  Patient may need to apply sparingly and wash off after an hour before eventually leaving it on overnight.  The patient verbalized understanding of the proper use and possible adverse effects of tazorac.  All of the patient's questions and concerns were addressed. Azithromycin Counseling:  I discussed with the patient the risks of azithromycin including but not limited to GI upset, allergic reaction, drug rash, diarrhea, and yeast infections. Topical Retinoid Pregnancy And Lactation Text: This medication is Pregnancy Category C. It is unknown if this medication is excreted in breast milk. Doxycycline Counseling:  Patient counseled regarding possible photosensitivity and increased risk for sunburn.  Patient instructed to avoid sunlight, if possible.  When exposed to sunlight, patients should wear protective clothing, sunglasses, and sunscreen.  The patient was instructed to call the office immediately if the following severe adverse effects occur:  hearing changes, easy bruising/bleeding, severe headache, or vision changes.  The patient verbalized understanding of the proper use and possible adverse effects of doxycycline.  All of the patient's questions and concerns were addressed. Winlevi Counseling:  I discussed with the patient the risks of topical clascoterone including but not limited to erythema, scaling, itching, and stinging. Patient voiced their understanding. Birth Control Pills Pregnancy And Lactation Text: This medication should be avoided if pregnant and for the first 30 days post-partum. Winlevi Pregnancy And Lactation Text: This medication is considered safe during pregnancy and breastfeeding. Topical Retinoid counseling:  Patient advised to apply a pea-sized amount only at bedtime and wait 30 minutes after washing their face before applying.  If too drying, patient may add a non-comedogenic moisturizer. The patient verbalized understanding of the proper use and possible adverse effects of retinoids.  All of the patient's questions and concerns were addressed. Bactrim Counseling:  I discussed with the patient the risks of sulfa antibiotics including but not limited to GI upset, allergic reaction, drug rash, diarrhea, dizziness, photosensitivity, and yeast infections.  Rarely, more serious reactions can occur including but not limited to aplastic anemia, agranulocytosis, methemoglobinemia, blood dyscrasias, liver or kidney failure, lung infiltrates or desquamative/blistering drug rashes. Dapsone Pregnancy And Lactation Text: This medication is Pregnancy Category C and is not considered safe during pregnancy or breast feeding. Azelaic Acid Pregnancy And Lactation Text: This medication is considered safe during pregnancy and breast feeding. Erythromycin Pregnancy And Lactation Text: This medication is Pregnancy Category B and is considered safe during pregnancy. It is also excreted in breast milk. Topical Sulfur Applications Pregnancy And Lactation Text: This medication is Pregnancy Category C and has an unknown safety profile during pregnancy. It is unknown if this topical medication is excreted in breast milk. High Dose Vitamin A Pregnancy And Lactation Text: High dose vitamin A therapy is contraindicated during pregnancy and breast feeding. High Dose Vitamin A Counseling: Side effects reviewed, pt to contact office should one occur. Topical Clindamycin Pregnancy And Lactation Text: This medication is Pregnancy Category B and is considered safe during pregnancy. It is unknown if it is excreted in breast milk. Detail Level: Zone Topical Sulfur Applications Counseling: Topical Sulfur Counseling: Patient counseled that this medication may cause skin irritation or allergic reactions.  In the event of skin irritation, the patient was advised to reduce the amount of the drug applied or use it less frequently.   The patient verbalized understanding of the proper use and possible adverse effects of topical sulfur application.  All of the patient's questions and concerns were addressed. Birth Control Pills Counseling: Birth Control Pill Counseling: I discussed with the patient the potential side effects of OCPs including but not limited to increased risk of stroke, heart attack, thrombophlebitis, deep venous thrombosis, hepatic adenomas, breast changes, GI upset, headaches, and depression.  The patient verbalized understanding of the proper use and possible adverse effects of OCPs. All of the patient's questions and concerns were addressed. Sarecycline Counseling: Patient advised regarding possible photosensitivity and discoloration of the teeth, skin, lips, tongue and gums.  Patient instructed to avoid sunlight, if possible.  When exposed to sunlight, patients should wear protective clothing, sunglasses, and sunscreen.  The patient was instructed to call the office immediately if the following severe adverse effects occur:  hearing changes, easy bruising/bleeding, severe headache, or vision changes.  The patient verbalized understanding of the proper use and possible adverse effects of sarecycline.  All of the patient's questions and concerns were addressed. Minocycline Pregnancy And Lactation Text: This medication is Pregnancy Category D and not consider safe during pregnancy. It is also excreted in breast milk. Isotretinoin Pregnancy And Lactation Text: This medication is Pregnancy Category X and is considered extremely dangerous during pregnancy. It is unknown if it is excreted in breast milk. Benzoyl Peroxide Pregnancy And Lactation Text: This medication is Pregnancy Category C. It is unknown if benzoyl peroxide is excreted in breast milk. Spironolactone Pregnancy And Lactation Text: This medication can cause feminization of the male fetus and should be avoided during pregnancy. The active metabolite is also found in breast milk. Aklief counseling:  Patient advised to apply a pea-sized amount only at bedtime and wait 30 minutes after washing their face before applying.  If too drying, patient may add a non-comedogenic moisturizer.  The most commonly reported side effects including irritation, redness, scaling, dryness, stinging, burning, itching, and increased risk of sunburn.  The patient verbalized understanding of the proper use and possible adverse effects of retinoids.  All of the patient's questions and concerns were addressed. Azithromycin Pregnancy And Lactation Text: This medication is considered safe during pregnancy and is also secreted in breast milk. Tetracycline Counseling: Patient counseled regarding possible photosensitivity and increased risk for sunburn.  Patient instructed to avoid sunlight, if possible.  When exposed to sunlight, patients should wear protective clothing, sunglasses, and sunscreen.  The patient was instructed to call the office immediately if the following severe adverse effects occur:  hearing changes, easy bruising/bleeding, severe headache, or vision changes.  The patient verbalized understanding of the proper use and possible adverse effects of tetracycline.  All of the patient's questions and concerns were addressed. Patient understands to avoid pregnancy while on therapy due to potential birth defects. Isotretinoin Counseling: Patient should get monthly blood tests, not donate blood, not drive at night if vision affected, not share medication, and not undergo elective surgery for 6 months after tx completed. Side effects reviewed, pt to contact office should one occur. Doxycycline Pregnancy And Lactation Text: This medication is Pregnancy Category D and not consider safe during pregnancy. It is also excreted in breast milk but is considered safe for shorter treatment courses. Bactrim Pregnancy And Lactation Text: This medication is Pregnancy Category D and is known to cause fetal risk.  It is also excreted in breast milk. Detail Level: Detailed

## 2025-02-08 ENCOUNTER — PHARMACY VISIT (OUTPATIENT)
Dept: PHARMACY | Facility: CLINIC | Age: 30
End: 2025-02-08
Payer: COMMERCIAL

## 2025-02-08 PROCEDURE — 2500000004 HC RX 250 GENERAL PHARMACY W/ HCPCS (ALT 636 FOR OP/ED): Performed by: OBSTETRICS & GYNECOLOGY

## 2025-02-08 PROCEDURE — 1100000001 HC PRIVATE ROOM DAILY

## 2025-02-08 PROCEDURE — 2500000001 HC RX 250 WO HCPCS SELF ADMINISTERED DRUGS (ALT 637 FOR MEDICARE OP): Performed by: OBSTETRICS & GYNECOLOGY

## 2025-02-08 PROCEDURE — RXMED WILLOW AMBULATORY MEDICATION CHARGE

## 2025-02-08 PROCEDURE — 2500000001 HC RX 250 WO HCPCS SELF ADMINISTERED DRUGS (ALT 637 FOR MEDICARE OP): Performed by: ADVANCED PRACTICE MIDWIFE

## 2025-02-08 RX ORDER — IBUPROFEN 600 MG/1
600 TABLET ORAL EVERY 6 HOURS PRN
Qty: 60 TABLET | Refills: 1 | Status: SHIPPED | OUTPATIENT
Start: 2025-02-08

## 2025-02-08 RX ORDER — ACETAMINOPHEN 325 MG/1
650 TABLET ORAL EVERY 6 HOURS PRN
Qty: 60 TABLET | Refills: 1 | Status: SHIPPED | OUTPATIENT
Start: 2025-02-08

## 2025-02-08 RX ADMIN — ACETAMINOPHEN 975 MG: 325 TABLET ORAL at 16:30

## 2025-02-08 RX ADMIN — ACETAMINOPHEN 975 MG: 325 TABLET ORAL at 23:20

## 2025-02-08 RX ADMIN — IBUPROFEN 600 MG: 600 TABLET, FILM COATED ORAL at 05:16

## 2025-02-08 RX ADMIN — IBUPROFEN 600 MG: 600 TABLET, FILM COATED ORAL at 16:30

## 2025-02-08 RX ADMIN — IBUPROFEN 600 MG: 600 TABLET, FILM COATED ORAL at 11:15

## 2025-02-08 RX ADMIN — ACETAMINOPHEN 975 MG: 325 TABLET ORAL at 05:16

## 2025-02-08 RX ADMIN — HUMAN RHO(D) IMMUNE GLOBULIN 300 MCG: 300 INJECTION, SOLUTION INTRAMUSCULAR at 21:04

## 2025-02-08 RX ADMIN — IBUPROFEN 600 MG: 600 TABLET, FILM COATED ORAL at 23:20

## 2025-02-08 RX ADMIN — ACETAMINOPHEN 975 MG: 325 TABLET ORAL at 11:15

## 2025-02-08 ASSESSMENT — PAIN SCALES - GENERAL
PAINLEVEL_OUTOF10: 0 - NO PAIN
PAINLEVEL_OUTOF10: 3
PAINLEVEL_OUTOF10: 0 - NO PAIN
PAIN_LEVEL: 3
PAINLEVEL_OUTOF10: 3
PAINLEVEL_OUTOF10: 0 - NO PAIN
PAINLEVEL_OUTOF10: 5 - MODERATE PAIN

## 2025-02-08 NOTE — ANESTHESIA POSTPROCEDURE EVALUATION
Patient: Tabby Castano    Procedure Summary       Date: 02/07/25 Room / Location:     Anesthesia Start: 0300 Anesthesia Stop: 0949    Procedure: Labor Analgesia Diagnosis:     Scheduled Providers:  Responsible Provider: CHAS Al    Anesthesia Type: epidural ASA Status: 3            Anesthesia Type: epidural    Vitals:    02/07/25 2338   BP: 113/88   Pulse: 101   Resp: 18   Temp: 36.3 °C (97.4 °F)   SpO2: 97%         Anesthesia Post Evaluation    Patient location during evaluation: bedside  Patient participation: complete - patient participated  Level of consciousness: awake and alert  Pain score: 3  Pain management: adequate  Airway patency: patent  Cardiovascular status: acceptable  Respiratory status: acceptable  Hydration status: acceptable  Postoperative Nausea and Vomiting: none        No notable events documented.     no chills/no sweating/no fever

## 2025-02-08 NOTE — DISCHARGE SUMMARY
Discharge Summary    Admission Date: 2/6/2025  Discharge Date: 2/9/2025        Discharge Diagnosis  Encounter for induction of labor    Hospital Course  Delivery Date: 2/7/2025 9:49 AM  Delivery type: Vaginal, Spontaneous   GA at delivery: 39w5d  Outcome: Living  Anesthesia during delivery: Epidural  Intrapartum complications: None  Feeding method: Breastfeeding Status: No     Procedures: none  Contraception at discharge: none      Pertinent Physical Exam At Time of Discharge    General: Examination reveals a well developed, well nourished, female, in no acute distress. She is alert and cooperative.  Breasts: intact.  Abdomen: soft, non-tender.  Fundus: firm and below umbilicus.  Perineum: well healing.  Extremities: no redness or tenderness in the calves or thighs, no edema.  Neurological: alert, oriented, normal speech, no focal findings or movement disorder noted.  Psychological: awake and alert; oriented to person, place, and time.    Last Vitals:  Temp Pulse Resp BP MAP Pulse Ox   36.5 °C (97.7 °F) 90 18 131/80 90 96 %     Discharge Meds     Your medication list        START taking these medications        Instructions Last Dose Given Next Dose Due   acetaminophen 325 mg tablet  Commonly known as: Tylenol      Take 2 tablets (650 mg) by mouth every 6 hours if needed for mild pain (1 - 3).       ibuprofen 600 mg tablet      Take 1 tablet (600 mg) by mouth every 6 hours if needed for mild pain (1 - 3).       lanolin cream  Commonly known as: Lansinoh      Apply 1 Application topically if needed for dry skin.              CONTINUE taking these medications        Instructions Last Dose Given Next Dose Due   levocetirizine 5 mg tablet  Commonly known as: Xyzal           Prenatal Vitamin Plus Low Iron 27 mg iron- 1 mg tablet  Generic drug: prenatal vitamin calcium-iron-folic      Take 1 tablet by mouth once daily.                 Where to Get Your Medications        These medications were sent to [facility]  [address], [facility] [id number] [id number]      Hours: 8 AM to 6 PM Mon-Fri, 9 AM to 1 PM Saturday Phone: [phone]   acetaminophen 325 mg tablet  ibuprofen 600 mg tablet       Information about where to get these medications is not yet available    Ask your nurse or doctor about these medications  lanolin cream          Complications Requiring Follow-Up  Routine 6 week pp follow up     Test Results Pending At Discharge  Pending Labs       No current pending labs.            Outpatient Follow-Up  Future Appointments   Date Time Provider Department Center   3/3/2025 11:00 AM Stepan Mccall MD [id number]       I spent 15 minutes in the professional and overall care of this patient.      Stefanie Mustafa, APRN-LIANNE

## 2025-02-09 VITALS
DIASTOLIC BLOOD PRESSURE: 82 MMHG | BODY MASS INDEX: 41.6 KG/M2 | HEIGHT: 65 IN | TEMPERATURE: 98 F | HEART RATE: 65 BPM | RESPIRATION RATE: 18 BRPM | SYSTOLIC BLOOD PRESSURE: 116 MMHG | WEIGHT: 249.67 LBS | OXYGEN SATURATION: 97 %

## 2025-02-09 PROCEDURE — 2500000001 HC RX 250 WO HCPCS SELF ADMINISTERED DRUGS (ALT 637 FOR MEDICARE OP): Performed by: OBSTETRICS & GYNECOLOGY

## 2025-02-09 RX ADMIN — ACETAMINOPHEN 975 MG: 325 TABLET ORAL at 05:05

## 2025-02-09 RX ADMIN — IBUPROFEN 600 MG: 600 TABLET, FILM COATED ORAL at 05:05

## 2025-02-09 RX ADMIN — ACETAMINOPHEN 975 MG: 325 TABLET ORAL at 11:06

## 2025-02-09 RX ADMIN — IBUPROFEN 600 MG: 600 TABLET, FILM COATED ORAL at 11:07

## 2025-02-09 ASSESSMENT — PAIN SCALES - GENERAL
PAINLEVEL_OUTOF10: 3
PAINLEVEL_OUTOF10: 0 - NO PAIN
PAINLEVEL_OUTOF10: 0 - NO PAIN

## 2025-02-09 NOTE — PROGRESS NOTES
Postpartum Progress Note    Assessment/Plan   Tabby Castano is a 29 y.o., , who delivered at 39w5d gestation and is now postpartum day 2.    Her discharge is complete.     Assessment & Plan  Encounter for induction of labor    Pregnancy Problems (from 24 to present)       Problem Noted Diagnosed Resolved    Encounter for induction of labor 2025 by JESSICA Galindo-LIANNE  No    Priority:  Medium       Excessive fetal growth affecting management of pregnancy in third trimester ([facility]) 2025 by Stepan Mccall MD  No    Priority:  Medium       39 weeks gestation of pregnancy ([facility]) 10/21/2024 by Stepan Mccall MD  No    Priority:  Medium       Overview Addendum 2024  9:57 AM by Stepan Mccall MD     Desired provider in labor: [] CNM  [] Physician  [x] Blood Products: [x] Yes, accepts [] No, needs counseling  [x] Initial BMI: Could not be calculated   [x] Prenatal Labs:   [x] Cervical Cancer Screening up to date  [x] Rh status:   [x] Genetic Screening:    [x] NT US: (11-13 wks)  [] Baby ASA (if indicated):  [x] Pregnancy dated by:     [] Anatomy US: (19-20 wks)  [] Federal Sterilization consent signed (if indicated):  [] 1hr GCT at 24-28wks:  [x] Rhogam (if indicated):   [] Fetal Surveillance (if indicated):  [x] Tdap (27-32 wks, may be given up to 36 wks if initial window missed):   [] RSV (32-36 wks) (Sept. to end of ):   [x] Flu Vaccine:  Patient declines    [] Breastfeeding:  [] Postpartum Birth control method:   [] GBS at 36 - 37 wks:  [] 39 weeks discussion of IOL vs. Expectant management:  [] Mode of delivery ( anticipated ):            Rh negative state in antepartum period ([facility]) 2024 by Stepan Mccall MD  No    Priority:  Medium             Hospital course: no complications  Vaginal Birth  Patient is not breastfeedingThe patient's blood type is AB NEG. The baby's blood type is A POS. Rhogam indicated and given.    Subjective   Her pain is well controlled with  current medications  She is passing flatus  She is ambulating well  She is tolerating a Adult diet Regular  She reports no breast or nursing problems  She denies emotional concerns today   Her plan for contraception is none         Objective   Allergies:   Patient has no known allergies.         Last Vitals:  Temp Pulse Resp BP MAP Pulse Ox   36.7 °C (98 °F) 65 18 116/82 89 97 %     Vitals Min/Max Last 24 Hours:  Temp  Min: 36.4 °C (97.5 °F)  Max: 36.7 °C (98 °F)  Pulse  Min: 65  Max: 73  Resp  Min: 18  Max: 18  BP  Min: 114/81  Max: 118/85  MAP (mmHg)  Min: 87  Max: 89    Intake/Output:   No intake or output data in the 24 hours ending 02/09/25 0916    Physical Exam:  General: Examination reveals a well developed, well nourished, female, in no acute distress. She is alert and cooperative.  Breasts: breasts appear normal for pregnancy, no suspicious masses, no skin or nipple changes or axillary nodes.  Fundus: firm, below umbilicus, and nontender.  Perineum: well approximated.  Psychological: awake and alert; oriented to person, place, and time.    Lab Data:  Labs in chart were reviewed.

## 2025-02-10 ENCOUNTER — APPOINTMENT (OUTPATIENT)
Dept: OBSTETRICS AND GYNECOLOGY | Facility: CLINIC | Age: 30
End: 2025-02-10
Payer: COMMERCIAL

## 2025-03-03 ENCOUNTER — APPOINTMENT (OUTPATIENT)
Dept: OBSTETRICS AND GYNECOLOGY | Facility: CLINIC | Age: 30
End: 2025-03-03
Payer: COMMERCIAL

## 2025-03-03 VITALS
WEIGHT: 227.2 LBS | BODY MASS INDEX: 35.66 KG/M2 | HEIGHT: 67 IN | SYSTOLIC BLOOD PRESSURE: 124 MMHG | DIASTOLIC BLOOD PRESSURE: 88 MMHG

## 2025-03-03 DIAGNOSIS — Z30.011 ENCOUNTER FOR INITIAL PRESCRIPTION OF CONTRACEPTIVE PILLS: Primary | ICD-10-CM

## 2025-03-03 PROCEDURE — 0503F POSTPARTUM CARE VISIT: CPT | Performed by: OBSTETRICS & GYNECOLOGY

## 2025-03-03 ASSESSMENT — EDINBURGH POSTNATAL DEPRESSION SCALE (EPDS)
I HAVE LOOKED FORWARD WITH ENJOYMENT TO THINGS: AS MUCH AS I EVER DID
I HAVE BEEN SO UNHAPPY THAT I HAVE HAD DIFFICULTY SLEEPING: NOT AT ALL
I HAVE FELT SAD OR MISERABLE: NO, NOT AT ALL
THINGS HAVE BEEN GETTING ON TOP OF ME: NO, I HAVE BEEN COPING AS WELL AS EVER
I HAVE BEEN ABLE TO LAUGH AND SEE THE FUNNY SIDE OF THINGS: AS MUCH AS I ALWAYS COULD
I HAVE BLAMED MYSELF UNNECESSARILY WHEN THINGS WENT WRONG: NO, NEVER
I HAVE BEEN ANXIOUS OR WORRIED FOR NO GOOD REASON: NO, NOT AT ALL
TOTAL SCORE: 0
THE THOUGHT OF HARMING MYSELF HAS OCCURRED TO ME: NEVER
I HAVE FELT SCARED OR PANICKY FOR NO GOOD REASON: NO, NOT AT ALL
I HAVE BEEN SO UNHAPPY THAT I HAVE BEEN CRYING: NO, NEVER

## 2025-03-03 NOTE — PROGRESS NOTES
Subjective   Patient ID: Tabby Castano is a 29 y.o. female who presents for No chief complaint on file..  HPI 2 weeks postpartum doing well seems very happy.  She denies breast-feeding.  She says very little lochia.  She is interested to go on oral contraceptive.  She denies postpartum blues or depression.  Her mom and the baby are here with her. Baby's name Journey.       Review of Systems   All other systems reviewed and are negative.      Objective   Physical Exam  Constitutional:       Appearance: Normal appearance.   Pulmonary:      Effort: Pulmonary effort is normal.   Neurological:      Mental Status: She is alert.   Psychiatric:         Mood and Affect: Mood normal.         Behavior: Behavior normal.         Thought Content: Thought content normal.         Judgment: Judgment normal.         Assessment/Plan   Problem List Items Addressed This Visit    None  Visit Diagnoses         Codes    Encounter for initial prescription of contraceptive pills    -  Primary Z30.011    Relevant Medications    drospirenone, contraceptive, 4 mg (28) tablet    Postpartum exam ([facility])     Z39.2        We discussed options for contraception and she has elected to go on progesterone only pills.  Manjit was E scribed and instruction reviewed.  I have recommended she returns in June for yearly pelvic and Pap smear.         Stepan Mccall MD 03/03/25 1:19 PM

## 2025-06-24 ENCOUNTER — APPOINTMENT (OUTPATIENT)
Dept: OBSTETRICS AND GYNECOLOGY | Facility: CLINIC | Age: 30
End: 2025-06-24
Payer: COMMERCIAL

## 2025-07-03 ENCOUNTER — OFFICE VISIT (OUTPATIENT)
Dept: URGENT CARE | Age: 30
End: 2025-07-03
Payer: COMMERCIAL

## 2025-07-03 ENCOUNTER — ANCILLARY PROCEDURE (OUTPATIENT)
Dept: URGENT CARE | Age: 30
End: 2025-07-03
Payer: COMMERCIAL

## 2025-07-03 VITALS
RESPIRATION RATE: 16 BRPM | DIASTOLIC BLOOD PRESSURE: 80 MMHG | OXYGEN SATURATION: 99 % | HEART RATE: 87 BPM | TEMPERATURE: 97 F | SYSTOLIC BLOOD PRESSURE: 133 MMHG

## 2025-07-03 DIAGNOSIS — M25.531 RIGHT WRIST PAIN: ICD-10-CM

## 2025-07-03 DIAGNOSIS — M65.4 DE QUERVAIN'S TENOSYNOVITIS, RIGHT: Primary | ICD-10-CM

## 2025-07-03 PROCEDURE — 73110 X-RAY EXAM OF WRIST: CPT | Mod: RIGHT SIDE | Performed by: PHYSICIAN ASSISTANT

## 2025-07-03 ASSESSMENT — ENCOUNTER SYMPTOMS
WEAKNESS: 1
NUMBNESS: 0
COLOR CHANGE: 0
ARTHRALGIAS: 1
JOINT SWELLING: 0
CHILLS: 0
FEVER: 0

## 2025-07-03 NOTE — PROGRESS NOTES
Subjective   Patient ID: Tabby Castano is a 29 y.o. female. They present today with a chief complaint of R wrist pain (No injury).    History of Present Illness  Patient presents for evaluation of right wrist pain that has been present for the past 3 months though feels to be worsening.  She states the pain is to radial aspect of her wrist.  At times this radiates down her thumb and up to mid forearm.  She states pain is worse with movements and gripping and lifting of her right hand.  She is left-hand dominant.  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  She has been taking Motrin for her pain with some relief of symptoms.  She states she felt a pop this morning in her wrist and states things actually have felt a little bit better the pain is still present.  She denies any associated numbness or tingling, swelling, bruising, redness or warmth.  She states she did drop a water cup yesterday so is not sure if she has had some weakness in the area.          Past Medical History  Allergies as of 07/03/2025    (No Known Allergies)       Prescriptions Prior to Admission[1]     Medical History[2]    Surgical History[3]     reports that she has never smoked. She has never been exposed to tobacco smoke. She has never used smokeless tobacco. She reports that she does not currently use alcohol. She reports that she does not use drugs.    Review of Systems  Review of Systems   Constitutional:  Negative for chills and fever.   Musculoskeletal:  Positive for arthralgias (right wrist). Negative for joint swelling.   Skin:  Negative for color change.   Neurological:  Positive for weakness. Negative for numbness.                                  Objective    Vitals:    07/03/25 1147   BP: 133/80   Pulse: 87   Resp: 16   Temp: 36.1 °C (97 °F)   SpO2: 99%     Patient's last menstrual period was 05/30/2025 (approximate).    Physical Exam  Vitals reviewed.   Constitutional:       General: She is not  in acute distress.  Musculoskeletal:      Right wrist: Tenderness (radial wrist) and snuff box tenderness present. No swelling, deformity, effusion, lacerations or crepitus. Normal range of motion. Normal pulse.      Comments: Positive Finkelstein's test on right wrist.  Negative Tinel's and Phalen sign to right wrist   Skin:     General: Skin is warm.      Capillary Refill: Capillary refill takes less than 2 seconds.      Findings: No ecchymosis or erythema.   Neurological:      Mental Status: She is alert.      Sensory: Sensation is intact.      Motor: Motor function is intact.         Procedures    Point of Care Test & Imaging Results from this visit  No results found for this visit on 07/03/25.   Imaging  XR wrist right 3+ views  Result Date: 7/3/2025  Unremarkable radiograph of the right wrist.     MACRO: None   Signed by: Eber Gaxiola 7/3/2025 12:07 PM Dictation workstation:   [id number]      Cardiology, Vascular, and Other Imaging  No other imaging results found for the past 2 days      Diagnostic study results (if any) were reviewed by Heidy Jorgensen PA-C.    Assessment/Plan   Allergies, medications, history, and pertinent labs/EKGs/Imaging reviewed by Heidy Jorgensen PA-C.     Medical Decision Making  MDM- Signs and symptoms consistent with tendonitis. Xray ordered for further evaluation to evaluate for bony abnormality, no acute fracture observed. Patient fitted for thumb spica splint to allow rest. Will treat with supportive measures including anti-inflammatory medications like ibuprofen (declined Rx today), icing, and rest. Advise follow up with orthopedics or PCP if symptoms persist or worsen.  Patient verbalized understanding and agrees with plan.      Orders and Diagnoses  Diagnoses and all orders for this visit:  De Quervain's tenosynovitis, right  -     Referral to Orthopedics and Sports Medicine; Future  Right wrist pain  -     XR wrist right 3+ views; Future  -     Referral to Orthopedics  and Sports Medicine; Future      Medical Admin Record      Patient disposition: Home    Electronically signed by Heidy Jorgensen PA-C  12:17 PM           [1] (Not in a hospital admission)   [2]   Past Medical History:  Diagnosis Date    Hyperlipidemia     PCOS (polycystic ovarian syndrome)    [3]   Past Surgical History:  Procedure Laterality Date    NASAL SEPTUM SURGERY      TOE SURGERY Left     TONSILECTOMY, ADENOIDECTOMY, BILATERAL MYRINGOTOMY AND TUBES      TYMPANOSTOMY TUBE PLACEMENT Bilateral

## 2025-07-18 ENCOUNTER — APPOINTMENT (OUTPATIENT)
Dept: OBSTETRICS AND GYNECOLOGY | Facility: CLINIC | Age: 30
End: 2025-07-18
Payer: COMMERCIAL

## 2025-07-24 ENCOUNTER — APPOINTMENT (OUTPATIENT)
Dept: OBSTETRICS AND GYNECOLOGY | Facility: CLINIC | Age: 30
End: 2025-07-24
Payer: COMMERCIAL

## 2025-07-24 VITALS
WEIGHT: 241 LBS | SYSTOLIC BLOOD PRESSURE: 130 MMHG | HEIGHT: 67 IN | DIASTOLIC BLOOD PRESSURE: 86 MMHG | BODY MASS INDEX: 37.83 KG/M2

## 2025-07-24 DIAGNOSIS — R87.610 ASCUS OF CERVIX WITH NEGATIVE HIGH RISK HPV: ICD-10-CM

## 2025-07-24 DIAGNOSIS — N91.4 SECONDARY OLIGOMENORRHEA: Primary | ICD-10-CM

## 2025-07-24 PROCEDURE — 1036F TOBACCO NON-USER: CPT | Performed by: OBSTETRICS & GYNECOLOGY

## 2025-07-24 PROCEDURE — 99395 PREV VISIT EST AGE 18-39: CPT | Performed by: OBSTETRICS & GYNECOLOGY

## 2025-07-24 PROCEDURE — 3008F BODY MASS INDEX DOCD: CPT | Performed by: OBSTETRICS & GYNECOLOGY

## 2025-07-24 RX ORDER — MEDROXYPROGESTERONE ACETATE 10 MG/1
10 TABLET ORAL DAILY
Qty: 14 TABLET | Refills: 3 | Status: SHIPPED | OUTPATIENT
Start: 2025-07-24 | End: 2026-07-24

## 2025-07-24 ASSESSMENT — EDINBURGH POSTNATAL DEPRESSION SCALE (EPDS)
I HAVE LOOKED FORWARD WITH ENJOYMENT TO THINGS: AS MUCH AS I EVER DID
I HAVE FELT SCARED OR PANICKY FOR NO GOOD REASON: NO, NOT AT ALL
I HAVE BLAMED MYSELF UNNECESSARILY WHEN THINGS WENT WRONG: NO, NEVER
I HAVE BEEN SO UNHAPPY THAT I HAVE HAD DIFFICULTY SLEEPING: NOT AT ALL
I HAVE FELT SAD OR MISERABLE: NO, NOT AT ALL
THINGS HAVE BEEN GETTING ON TOP OF ME: NO, I HAVE BEEN COPING AS WELL AS EVER
I HAVE BEEN ANXIOUS OR WORRIED FOR NO GOOD REASON: NO, NOT AT ALL
TOTAL SCORE: 0
I HAVE BEEN ABLE TO LAUGH AND SEE THE FUNNY SIDE OF THINGS: AS MUCH AS I ALWAYS COULD
I HAVE BEEN SO UNHAPPY THAT I HAVE BEEN CRYING: NO, NEVER
THE THOUGHT OF HARMING MYSELF HAS OCCURRED TO ME: NEVER

## 2025-07-24 ASSESSMENT — COLUMBIA-SUICIDE SEVERITY RATING SCALE - C-SSRS
6. HAVE YOU EVER DONE ANYTHING, STARTED TO DO ANYTHING, OR PREPARED TO DO ANYTHING TO END YOUR LIFE?: NO
2. HAVE YOU ACTUALLY HAD ANY THOUGHTS OF KILLING YOURSELF?: NO
1. IN THE PAST MONTH, HAVE YOU WISHED YOU WERE DEAD OR WISHED YOU COULD GO TO SLEEP AND NOT WAKE UP?: NO

## 2025-07-24 ASSESSMENT — PATIENT HEALTH QUESTIONNAIRE - PHQ9
SUM OF ALL RESPONSES TO PHQ9 QUESTIONS 1 AND 2: 0
1. LITTLE INTEREST OR PLEASURE IN DOING THINGS: NOT AT ALL
2. FEELING DOWN, DEPRESSED OR HOPELESS: NOT AT ALL

## 2025-07-24 NOTE — PROGRESS NOTES
Subjective   Patient ID: Tabby Castano is a 29 y.o. female who presents for Annual Exam (Not taking OCP's, C/O legs swelling and elevated BP's.).  HPI 29 years old G1, P1 postpartum  5 months is not breast-feeding presents for yearly exam.  She says that her blood pressures have been slightly high compared to her pressures prior to pregnancy.  She also says that her legs are still swelling since pregnancy.  She denies any pain in her legs.  She reports 1 period   Since her delivery.  She says this is not unusual she has a history of oligomenorrhea usually responding to Provera.  She was prescribed minipill in the past but it was not covered by her insurance and says that she does not want to go on contraception.    Review of Systems   All other systems reviewed and are negative.  As stated in the HPI    Objective   Physical Exam  Vitals reviewed.   Constitutional:       Appearance: Normal appearance. She is obese.   HENT:      Head: Normocephalic and atraumatic.      Nose: Nose normal.     Cardiovascular:      Rate and Rhythm: Normal rate and regular rhythm.   Pulmonary:      Effort: Pulmonary effort is normal.      Breath sounds: Normal breath sounds.   Chest:      Chest wall: No mass.   Breasts:     Right: Normal.      Left: Normal.   Abdominal:      General: Abdomen is flat. Bowel sounds are normal. There is no distension.      Palpations: Abdomen is soft. There is no mass.   Genitourinary:     General: Normal vulva.      Vagina: Normal.      Cervix: Normal.      Uterus: Normal.       Adnexa: Right adnexa normal and left adnexa normal.      Rectum: Normal.     Musculoskeletal:         General: Normal range of motion.      Cervical back: Normal range of motion.      Right lower leg: Edema present.      Left lower leg: Edema present.     Skin:     General: Skin is warm and dry.     Neurological:      General: No focal deficit present.      Mental Status: She is alert.     Psychiatric:         Mood and Affect:  Mood normal.         Behavior: Behavior normal.         Assessment/Plan   Problem List Items Addressed This Visit    None  Visit Diagnoses         Codes      Secondary oligomenorrhea    -  Primary N91.4    Relevant Medications    medroxyPROGESTERone (Provera) 10 mg tablet      ASCUS of cervix with negative high risk HPV     R87.610    Relevant Medications    medroxyPROGESTERone (Provera) 10 mg tablet    Other Relevant Orders    THINPREP PAP TEST (25-30)        The repeat Pap smear was done today due to ASCUS Pap last year.  I have encouraged her to follow-up with her primary care for further workup of lower leg edema.  I E scribed a prescription for Provera she can use that as needed to start the cycle.  Follow-up as needed or yearly.         Stepan Mccall MD 07/24/25 11:48 AM

## 2025-08-06 LAB
CYTOLOGY CMNT CVX/VAG CYTO-IMP: NORMAL
LAB AP HPV GENOTYPE QUESTION: YES
LAB AP HPV HR: NORMAL
LAB AP PREVIOUS ABNORMAL HISTORY: NORMAL
LABORATORY COMMENT REPORT: NORMAL
PATH REPORT.TOTAL CANCER: NORMAL
RESIDENT REVIEW: NORMAL

## 2025-08-28 ENCOUNTER — APPOINTMENT (OUTPATIENT)
Dept: PRIMARY CARE | Facility: CLINIC | Age: 30
End: 2025-08-28
Payer: COMMERCIAL

## 2025-08-28 VITALS
BODY MASS INDEX: 38.99 KG/M2 | HEART RATE: 97 BPM | DIASTOLIC BLOOD PRESSURE: 72 MMHG | TEMPERATURE: 98.1 F | WEIGHT: 242.6 LBS | HEIGHT: 66 IN | OXYGEN SATURATION: 98 % | SYSTOLIC BLOOD PRESSURE: 118 MMHG

## 2025-08-28 DIAGNOSIS — E66.09 CLASS 2 OBESITY DUE TO EXCESS CALORIES WITHOUT SERIOUS COMORBIDITY WITH BODY MASS INDEX (BMI) OF 39.0 TO 39.9 IN ADULT: ICD-10-CM

## 2025-08-28 DIAGNOSIS — Z00.00 WELL ADULT EXAM: Primary | ICD-10-CM

## 2025-08-28 DIAGNOSIS — N91.2 AMENORRHEA: ICD-10-CM

## 2025-08-28 DIAGNOSIS — E66.812 CLASS 2 OBESITY DUE TO EXCESS CALORIES WITHOUT SERIOUS COMORBIDITY WITH BODY MASS INDEX (BMI) OF 39.0 TO 39.9 IN ADULT: ICD-10-CM

## 2025-08-28 DIAGNOSIS — N92.6 ABNORMAL MENSTRUATION: ICD-10-CM

## 2025-08-28 DIAGNOSIS — R60.0 LOWER LEG EDEMA: ICD-10-CM

## 2025-08-28 DIAGNOSIS — Z86.39 HISTORY OF HYPERLIPIDEMIA: ICD-10-CM

## 2025-08-28 LAB — PREGNANCY TEST URINE, POC: NEGATIVE

## 2025-08-28 PROCEDURE — 1036F TOBACCO NON-USER: CPT | Performed by: PHYSICIAN ASSISTANT

## 2025-08-28 PROCEDURE — 99213 OFFICE O/P EST LOW 20 MIN: CPT | Performed by: PHYSICIAN ASSISTANT

## 2025-08-28 PROCEDURE — 3008F BODY MASS INDEX DOCD: CPT | Performed by: PHYSICIAN ASSISTANT

## 2025-08-28 PROCEDURE — 81025 URINE PREGNANCY TEST: CPT | Mod: CLIA WAIVED TEST | Performed by: PHYSICIAN ASSISTANT

## 2025-08-28 PROCEDURE — 99395 PREV VISIT EST AGE 18-39: CPT | Performed by: PHYSICIAN ASSISTANT

## 2025-08-28 RX ORDER — HYDROCHLOROTHIAZIDE 12.5 MG/1
12.5 TABLET ORAL DAILY
Qty: 30 TABLET | Refills: 0 | Status: SHIPPED | OUTPATIENT
Start: 2025-08-28 | End: 2025-09-27

## 2025-08-28 ASSESSMENT — PATIENT HEALTH QUESTIONNAIRE - PHQ9
2. FEELING DOWN, DEPRESSED OR HOPELESS: NOT AT ALL
1. LITTLE INTEREST OR PLEASURE IN DOING THINGS: NOT AT ALL
SUM OF ALL RESPONSES TO PHQ9 QUESTIONS 1 AND 2: 0

## 2025-08-29 LAB
ALBUMIN SERPL-MCNC: 4.1 G/DL (ref 3.6–5.1)
ALP SERPL-CCNC: 50 U/L (ref 31–125)
ALT SERPL-CCNC: 36 U/L (ref 6–29)
ANION GAP SERPL CALCULATED.4IONS-SCNC: 7 MMOL/L (CALC) (ref 7–17)
AST SERPL-CCNC: 27 U/L (ref 10–30)
BILIRUB SERPL-MCNC: 0.4 MG/DL (ref 0.2–1.2)
BNP SERPL-MCNC: 7 PG/ML
BUN SERPL-MCNC: 12 MG/DL (ref 7–25)
CALCIUM SERPL-MCNC: 9.7 MG/DL (ref 8.6–10.2)
CHLORIDE SERPL-SCNC: 107 MMOL/L (ref 98–110)
CHOLEST SERPL-MCNC: 197 MG/DL
CHOLEST/HDLC SERPL: 3.8 (CALC)
CO2 SERPL-SCNC: 26 MMOL/L (ref 20–32)
CREAT SERPL-MCNC: 0.6 MG/DL (ref 0.5–0.96)
EGFRCR SERPLBLD CKD-EPI 2021: 126 ML/MIN/1.73M2
ERYTHROCYTE [DISTWIDTH] IN BLOOD BY AUTOMATED COUNT: 13 % (ref 11–15)
EST. AVERAGE GLUCOSE BLD GHB EST-MCNC: 105 MG/DL
EST. AVERAGE GLUCOSE BLD GHB EST-SCNC: 5.8 MMOL/L
GLUCOSE SERPL-MCNC: 92 MG/DL (ref 65–99)
HBA1C MFR BLD: 5.3 %
HCT VFR BLD AUTO: 42.2 % (ref 35–45)
HDLC SERPL-MCNC: 52 MG/DL
HGB BLD-MCNC: 13.6 G/DL (ref 11.7–15.5)
LDLC SERPL CALC-MCNC: 115 MG/DL (CALC)
MCH RBC QN AUTO: 26.5 PG (ref 27–33)
MCHC RBC AUTO-ENTMCNC: 32.2 G/DL (ref 32–36)
MCV RBC AUTO: 82.3 FL (ref 80–100)
NONHDLC SERPL-MCNC: 145 MG/DL (CALC)
PLATELET # BLD AUTO: 310 THOUSAND/UL (ref 140–400)
PMV BLD REES-ECKER: 9.8 FL (ref 7.5–12.5)
POTASSIUM SERPL-SCNC: 4.5 MMOL/L (ref 3.5–5.3)
PROT SERPL-MCNC: 6.3 G/DL (ref 6.1–8.1)
RBC # BLD AUTO: 5.13 MILLION/UL (ref 3.8–5.1)
SODIUM SERPL-SCNC: 140 MMOL/L (ref 135–146)
TRIGL SERPL-MCNC: 180 MG/DL
WBC # BLD AUTO: 6.3 THOUSAND/UL (ref 3.8–10.8)

## 2025-09-04 ENCOUNTER — TELEMEDICINE CLINICAL SUPPORT (OUTPATIENT)
Dept: NUTRITION | Facility: CLINIC | Age: 30
End: 2025-09-04
Payer: COMMERCIAL

## 2025-09-04 VITALS — BODY MASS INDEX: 38.89 KG/M2 | WEIGHT: 242 LBS | HEIGHT: 66 IN

## 2025-09-04 DIAGNOSIS — E66.09 CLASS 2 OBESITY DUE TO EXCESS CALORIES WITHOUT SERIOUS COMORBIDITY WITH BODY MASS INDEX (BMI) OF 39.0 TO 39.9 IN ADULT: Primary | ICD-10-CM

## 2025-09-04 DIAGNOSIS — E66.812 CLASS 2 OBESITY DUE TO EXCESS CALORIES WITHOUT SERIOUS COMORBIDITY WITH BODY MASS INDEX (BMI) OF 39.0 TO 39.9 IN ADULT: Primary | ICD-10-CM

## 2025-09-04 PROCEDURE — 97803 MED NUTRITION INDIV SUBSEQ: CPT | Performed by: DIETITIAN, REGISTERED

## 2025-09-18 ENCOUNTER — APPOINTMENT (OUTPATIENT)
Dept: PRIMARY CARE | Facility: CLINIC | Age: 30
End: 2025-09-18
Payer: COMMERCIAL

## 2025-09-25 ENCOUNTER — APPOINTMENT (OUTPATIENT)
Dept: NUTRITION | Facility: CLINIC | Age: 30
End: 2025-09-25
Payer: COMMERCIAL

## 2025-09-30 ENCOUNTER — APPOINTMENT (OUTPATIENT)
Dept: NUTRITION | Facility: HOSPITAL | Age: 30
End: 2025-09-30
Payer: COMMERCIAL

## 2026-07-27 ENCOUNTER — APPOINTMENT (OUTPATIENT)
Dept: OBSTETRICS AND GYNECOLOGY | Facility: CLINIC | Age: 31
End: 2026-07-27
Payer: COMMERCIAL